# Patient Record
Sex: FEMALE | Race: WHITE | Employment: FULL TIME | URBAN - METROPOLITAN AREA
[De-identification: names, ages, dates, MRNs, and addresses within clinical notes are randomized per-mention and may not be internally consistent; named-entity substitution may affect disease eponyms.]

---

## 2021-03-10 ENCOUNTER — HOSPITAL ENCOUNTER (OUTPATIENT)
Dept: GENERAL RADIOLOGY | Age: 66
Discharge: HOME | End: 2021-03-10

## 2021-03-10 DIAGNOSIS — R06.02 SHORTNESS OF BREATH: ICD-10-CM

## 2021-03-10 DIAGNOSIS — R05.9 COUGH: ICD-10-CM

## 2021-12-21 PROBLEM — N30.01 ACUTE CYSTITIS WITH HEMATURIA: Status: ACTIVE | Noted: 2021-12-21

## 2021-12-21 PROBLEM — F17.200 SMOKING ADDICTION: Status: ACTIVE | Noted: 2021-12-21

## 2021-12-21 PROBLEM — F41.1 GAD (GENERALIZED ANXIETY DISORDER): Status: ACTIVE | Noted: 2021-12-21

## 2021-12-21 PROBLEM — J20.8 ACUTE BRONCHITIS DUE TO OTHER SPECIFIED ORGANISMS: Status: ACTIVE | Noted: 2021-12-21

## 2021-12-21 PROBLEM — R05.9 COUGH: Status: ACTIVE | Noted: 2021-12-21

## 2021-12-21 PROBLEM — F32.A DEPRESSION: Status: ACTIVE | Noted: 2021-12-21

## 2022-03-19 PROBLEM — F32.A DEPRESSION: Status: ACTIVE | Noted: 2021-12-21

## 2022-03-19 PROBLEM — R05.9 COUGH: Status: ACTIVE | Noted: 2021-12-21

## 2022-03-19 PROBLEM — F17.200 SMOKING ADDICTION: Status: ACTIVE | Noted: 2021-12-21

## 2022-03-19 PROBLEM — N30.01 ACUTE CYSTITIS WITH HEMATURIA: Status: ACTIVE | Noted: 2021-12-21

## 2022-03-19 PROBLEM — J20.8 ACUTE BRONCHITIS DUE TO OTHER SPECIFIED ORGANISMS: Status: ACTIVE | Noted: 2021-12-21

## 2022-03-20 PROBLEM — F41.1 GAD (GENERALIZED ANXIETY DISORDER): Status: ACTIVE | Noted: 2021-12-21

## 2022-04-12 ENCOUNTER — NURSE TRIAGE (OUTPATIENT)
Dept: OTHER | Facility: CLINIC | Age: 67
End: 2022-04-12

## 2022-04-12 NOTE — TELEPHONE ENCOUNTER
Received call from Aden Ontiveros at [facility] with [employer] Complaint. Subjective: Caller states \"I have been sick for over a week and it is not  Going a way. I am taking allergy medicine. I have coughing fits that are horrible. I coughed up a yellow colored sputum the other day. I took a covid test 2 days ago and it was negative. \"     Current Symptoms: congestion, cough,  voice hoarseness, sob, fatigue    Onset: 1 week ago; unchanged    Associated Symptoms: reduced activity    Pain Severity: Denies   Temperature: Denies    What has been tried: allegra, inhaler     LMP: NA Pregnant: NA    Recommended disposition: See in Office Today    Care advice provided, patient verbalizes understanding; denies any other questions or concerns; instructed to call back for any new or worsening symptoms. Patient/Caller agrees with recommended disposition; writer provided warm transfer to [place] for appointment scheduling    Attention Provider: Thank you for allowing me to participate in the care of your patient. The patient was connected to triage in response to information provided to the [facility]. Please do not respond through this encounter as the response is not directed to a shared pool.       Reason for Disposition   MILD difficulty breathing (e.g., minimal/no SOB at rest, SOB with walking, pulse <100) and still present when not coughing    Protocols used: COUGH-ADULT-[place]

## 2022-04-14 ENCOUNTER — NURSE TRIAGE (OUTPATIENT)
Dept: OTHER | Facility: CLINIC | Age: 67
End: 2022-04-14

## 2022-04-14 NOTE — TELEPHONE ENCOUNTER
Received call from Teagan Barraza at [facility] with [employer] Complaint. Subjective: Caller states \"Sinus pressure\"     Current Symptoms: Sinus pressure and tooth pain. Taking  hydrocodone and Flonase. Requesting new order of Flonase. Onset: a few days ago; worsening    Associated Symptoms: NA    Pain Severity: 10/10; pain; constant    Temperature: denies fever    What has been tried: Meds    LMP: NA Pregnant: NA    Recommended disposition: Go to Office Now    Care advice provided, patient verbalizes understanding; denies any other questions or concerns; instructed to call back for any new or worsening symptoms. Patient/Caller agrees with recommended disposition; writer provided warm transfer to [place] at [facility] for appointment scheduling    Attention Provider: Thank you for allowing me to participate in the care of your patient. The patient was connected to triage in response to information provided to the [facility]. Please do not respond through this encounter as the response is not directed to a shared pool.       Reason for Disposition   SEVERE sinus pain    Protocols used: SINUS PAIN OR CONGESTION-ADULT-[place]

## 2022-04-19 ENCOUNTER — NURSE TRIAGE (OUTPATIENT)
Dept: OTHER | Facility: CLINIC | Age: 67
End: 2022-04-19

## 2022-04-19 NOTE — TELEPHONE ENCOUNTER
Received call from Elias at [facility] with [employer] Complaint. Subjective: Caller states \"I am worried that I have pneumonia\"     Current Symptoms: chest pain, sore throat are new symptoms. She is also having URI symptoms that she has been evaluated for on 4/12. Onset: 3 days ago; gradual    Associated Symptoms: NA    Pain Severity: 7/10; burning; intermittent worse when coughing    Temperature:  Denies fever    What has been tried: seen by PCP on 4/12. She admits to not following medication regimen as strictly as prescribed. She states that her inhaler is broken, so she has not been using it. LMP: NA Pregnant: No    Recommended disposition: See in Office Today or Tomorrow    Care advice provided, patient verbalizes understanding; denies any other questions or concerns; instructed to call back for any new or worsening symptoms. Patient/Caller agrees with recommended disposition; writer provided warm transfer to [facility] for appointment scheduling    Attention Provider: Thank you for allowing me to participate in the care of your patient. The patient was connected to triage in response to information provided to the [facility]. Please do not respond through this encounter as the response is not directed to a shared pool.     Reason for Disposition   Sinus congestion (pressure, fullness) present > 10 days    Protocols used: COMMON COLD-ADULT-[place]

## 2022-05-24 ENCOUNTER — TELEPHONE (OUTPATIENT)
Dept: FAMILY MEDICINE CLINIC | Facility: CLINIC | Age: 67
End: 2022-05-24

## 2022-05-24 NOTE — TELEPHONE ENCOUNTER
Patient was seen Friday and said that the medication she was given for her Naveed Drum issues has not helped. Her symptoms seems to be getting worse. She asked if you would like her to do a UA or call her in something else?

## 2022-05-25 ENCOUNTER — TELEPHONE (OUTPATIENT)
Dept: FAMILY MEDICINE CLINIC | Facility: CLINIC | Age: 67
End: 2022-05-25

## 2022-05-25 NOTE — TELEPHONE ENCOUNTER
----- Message from Ronak Gonzalez sent at 5/25/2022  9:45 AM EDT -----  Subject: Message to Provider    QUESTIONS  Information for Provider? pt was seen in the ED on 5/24 and received the   meds she was requesting   ---------------------------------------------------------------------------  --------------  CALL BACK INFO  What is the best way for the office to contact you? OK to leave message on   voicemail  Preferred Call Back Phone Number? [phone]  ---------------------------------------------------------------------------  --------------  SCRIPT ANSWERS  Relationship to Patient?  Self

## 2022-07-13 ENCOUNTER — TELEPHONE (OUTPATIENT)
Dept: FAMILY MEDICINE CLINIC | Facility: CLINIC | Age: 67
End: 2022-07-13

## 2022-07-13 NOTE — TELEPHONE ENCOUNTER
Above noted. Patient needs to have a face to face appointment for her symptoms and also for her refills. This was explained to her at her last appointment. She needs further work up for her chronic cough as she is a heavy smoker.   Nataliia Rosales

## 2022-07-13 NOTE — TELEPHONE ENCOUNTER
Patient called and requested an urgent sick appt with the [facility]. Call was transferred to me for appt. Alesia Mendoza had availability for today at 2:00 so I spoke to Alesia Mendoza for approval since patient was presenting covid like symptoms with severe cough and difficulty breathing. Alesia Mendoza requested that patient be scheduled for today but when she arrives at the office to remain in car for the MA to come out and do a covid test prior to visit. Patient declined appt. because she has something to do for work. Advised patient that with the severity of her symptoms that she has reported, it is best she go to [facility] after work. Patient said she may go but was not sure.

## 2022-09-20 ENCOUNTER — TELEPHONE (OUTPATIENT)
Dept: FAMILY MEDICINE CLINIC | Facility: CLINIC | Age: 67
End: 2022-09-20

## 2022-09-20 DIAGNOSIS — F41.1 GAD (GENERALIZED ANXIETY DISORDER): Primary | ICD-10-CM

## 2022-09-20 RX ORDER — ALPRAZOLAM 1 MG/1
1 TABLET ORAL 3 TIMES DAILY
Qty: 21 TABLET | Refills: 0 | Status: SHIPPED | OUTPATIENT
Start: 2022-09-20 | End: 2022-09-27

## 2022-09-20 NOTE — TELEPHONE ENCOUNTER
----- Message from Dolly Ahumada sent at 9/20/2022  8:37 AM EDT -----  Subject: Refill Request    QUESTIONS  Name of Medication? ALPRAZolam (XANAX) 1 MG tablet  Patient-reported dosage and instructions? 1 MG take three times a day   How many days do you have left? 1  Preferred Pharmacy? [email or web address] phone number (if available)? [phone]  Additional Information for Provider? Patient is out of this medication   completely, and she does have an appointment for 09/28, but she is going   to be out of her medication by then. Please call her back as soon as   possible. Thank you.  ---------------------------------------------------------------------------  --------------  CALL BACK INFO  What is the best way for the office to contact you? OK to leave message on   voicemail  Preferred Call Back Phone Number? [phone]  ---------------------------------------------------------------------------  --------------  SCRIPT ANSWERS  Relationship to Patient?  Self

## 2022-09-28 ENCOUNTER — OFFICE VISIT (OUTPATIENT)
Dept: FAMILY MEDICINE CLINIC | Facility: CLINIC | Age: 67
End: 2022-09-28

## 2022-09-28 VITALS
OXYGEN SATURATION: 96 % | RESPIRATION RATE: 16 BRPM | DIASTOLIC BLOOD PRESSURE: 68 MMHG | TEMPERATURE: 97.9 F | BODY MASS INDEX: 24.41 KG/M2 | WEIGHT: 143 LBS | HEIGHT: 64 IN | HEART RATE: 81 BPM | SYSTOLIC BLOOD PRESSURE: 104 MMHG

## 2022-09-28 DIAGNOSIS — J42 CHRONIC BRONCHITIS, UNSPECIFIED CHRONIC BRONCHITIS TYPE (HCC): ICD-10-CM

## 2022-09-28 DIAGNOSIS — F41.0 PANIC DISORDER: Primary | ICD-10-CM

## 2022-09-28 DIAGNOSIS — F51.04 CHRONIC INSOMNIA: ICD-10-CM

## 2022-09-28 LAB
11-NOR-9-THC-9-COOH, POC: NEGATIVE
AMPHETAMINE, URINE, POC: NEGATIVE
BENZODIAZEPINES, URINE, POC: ABNORMAL
BENZOYLECGONINE, URINE, POC: NEGATIVE
METHADONE, URINE, POC: NEGATIVE
METHAMPHETAMINE, URINE, POC: NEGATIVE
MORPHINE, URINE, POC: NEGATIVE
PHENCYCLIDINE, URINE, POC: NEGATIVE
URINALYSIS COLOR, POC: YELLOW

## 2022-09-28 PROCEDURE — 80305 DRUG TEST PRSMV DIR OPT OBS: CPT | Performed by: NURSE PRACTITIONER

## 2022-09-28 PROCEDURE — 99213 OFFICE O/P EST LOW 20 MIN: CPT | Performed by: NURSE PRACTITIONER

## 2022-09-28 PROCEDURE — 1123F ACP DISCUSS/DSCN MKR DOCD: CPT | Performed by: NURSE PRACTITIONER

## 2022-09-28 RX ORDER — ALPRAZOLAM 1 MG/1
1 TABLET ORAL 3 TIMES DAILY
Qty: 90 TABLET | Refills: 2 | Status: SHIPPED | OUTPATIENT
Start: 2022-09-28 | End: 2022-12-27

## 2022-09-28 RX ORDER — ALBUTEROL SULFATE 90 UG/1
2 AEROSOL, METERED RESPIRATORY (INHALATION) EVERY 6 HOURS PRN
Qty: 18 G | Refills: 5 | COMMUNITY
Start: 2022-09-28

## 2022-09-28 RX ORDER — TRAZODONE HYDROCHLORIDE 50 MG/1
TABLET ORAL
Qty: 90 TABLET | Refills: 1 | Status: SHIPPED | OUTPATIENT
Start: 2022-09-28

## 2022-09-28 RX ORDER — ALPRAZOLAM 1 MG/1
1 TABLET ORAL 3 TIMES DAILY
COMMUNITY
Start: 2021-01-20 | End: 2022-09-28 | Stop reason: SDUPTHER

## 2022-09-28 RX ORDER — FLUTICASONE PROPIONATE 50 MCG
2 SPRAY, SUSPENSION (ML) NASAL DAILY
Qty: 16 G | Refills: 5 | COMMUNITY
Start: 2022-09-28

## 2022-09-28 RX ORDER — BUDESONIDE, GLYCOPYRROLATE, AND FORMOTEROL FUMARATE 160; 9; 4.8 UG/1; UG/1; UG/1
AEROSOL, METERED RESPIRATORY (INHALATION)
Qty: 1 EACH | Refills: 5 | COMMUNITY
Start: 2022-09-28

## 2022-09-28 ASSESSMENT — ENCOUNTER SYMPTOMS
DIARRHEA: 0
VOMITING: 0
RHINORRHEA: 0
WHEEZING: 0
BLOOD IN STOOL: 0
CONSTIPATION: 0
CHEST TIGHTNESS: 0
EYE PAIN: 0
SHORTNESS OF BREATH: 0
EYE DISCHARGE: 0
ABDOMINAL PAIN: 0
COUGH: 0

## 2022-09-28 ASSESSMENT — PATIENT HEALTH QUESTIONNAIRE - PHQ9
2. FEELING DOWN, DEPRESSED OR HOPELESS: 0
SUM OF ALL RESPONSES TO PHQ QUESTIONS 1-9: 0
SUM OF ALL RESPONSES TO PHQ9 QUESTIONS 1 & 2: 0
SUM OF ALL RESPONSES TO PHQ QUESTIONS 1-9: 0
1. LITTLE INTEREST OR PLEASURE IN DOING THINGS: 0

## 2022-09-28 NOTE — PATIENT INSTRUCTIONS
Patient Education        A Healthy Lifestyle: Care Instructions  A healthy lifestyle can help you feel good, have more energy, and stay at a weight that's healthy for you. You can share a healthy lifestyle with your friends and family. And you can do it on your own. Eat meals with your friends or family. You could try cooking together. Plan activities with other people. Go for a walk with a friend, try a free online fitness class, or join a sports league. Eat a variety of healthy foods. These include fruits, vegetables, whole grains, low-fat dairy, and lean protein. Choose healthy portions of food. You can use the Nutrition Facts label on food packages as a guide. Eat more fruits and vegetables. You could add vegetables to sandwiches or add fruit to cereal.  Drink water when you are thirsty. Limit soda, juice, and sports drinks. Try to exercise most days. Aim for at least 2½ hours of exercise each week. Keep moving. Work in the garden or take your dog on a walk. Use the stairs instead of the elevator. If you use tobacco or nicotine, try to quit. Ask your doctor about programs and medicines to help you quit. Limit alcohol. Men should have no more than 2 drinks a day. Women should have no more than 1. For some people, no alcohol is the best choice. Follow-up care is a key part of your treatment and safety. Be sure to make and go to all appointments, and call your doctor if you are having problems. It's also a good idea to know your test results and keep a list of the medicines you take. Where can you learn more? Go to [email or web address]://[email or web address].Knip. org and sign in to your [employer] account. Enter E018 in the [facility] box to learn more about \"A Healthy Lifestyle: Care Instructions. \"     If you do not have an account, please click on the \"Sign Up Now\" link. Current as of: March 9, 2022               Content Version: 13.4  © 9653-5865 Healthwise, Incorporated.    Care instructions adapted under license by [facility] ([facility]). If you have questions about a medical condition or this instruction, always ask your healthcare professional. [address] any warranty or liability for your use of this information.

## 2022-09-28 NOTE — PROGRESS NOTES
Destin Stack (:  1955) is a 75 y.o. female,Established patient, here for evaluation of the following chief complaint(s):  Medication Refill and Sleep Problem    UDS:  positive for benzos only. ASSESSMENT/PLAN:  1. Panic disorder  -     ALPRAZolam (XANAX) 1 MG tablet; Take 1 tablet by mouth 3 times daily for 90 days. , Disp-90 tablet, R-2Normal  -     AMB POC DRUG SCREEN, URINE    I have reviewed the patients controlled substance prescription history, as maintained in the [place] prescription monitoring program, so that the prescription(s) for a  controlled substance can be given. No abnormalities were identified. 2. Chronic bronchitis, unspecified chronic bronchitis type (HCC)    Continue inhalers. Smoking cessation. 3. Chronic insomnia  -     traZODone (DESYREL) 50 MG tablet; One or two po at bedtime for sleep., Disp-90 tablet, R-1Normal    Will start Trazodone. Take at bedtime. Good sleep hygiene. Return in about 3 months (around 2022) for medication refills. Subjective   SUBJECTIVE/OBJECTIVE:  Medication refills:  Complains that she is not getting adequate sleep at night. Is unable to fall asleep so a lot of times, she just gets up. Needs refills on her alprazolam today. Has stopped her Zoloft and Wellbutrin. She did not think these were helping. Continues to smoke. Complains of being short of breath at times--uses her inhaler once a day. Continues to work from home. Is trying to qualify for her medicare. Mental Health Problem  Primary symptoms comment: Chronic anxiety, chronic insomnia--worsening. This is a chronic problem. The degree of incapacity that she is experiencing as a consequence of her illness is moderate. Additional symptoms of the illness include insomnia, attention impairment and distractible. Additional symptoms of the illness do not include fatigue, headaches, abdominal pain or seizures.  She does not admit to suicidal ideas. She does not have a plan to attempt suicide. She does not contemplate harming herself. She has not already injured self. She does not contemplate injuring another person. She has not already  injured another person. Allergies   Allergen Reactions    Codeine Nausea And Vomiting     Current Outpatient Medications   Medication Sig    albuterol sulfate HFA (PROVENTIL;VENTOLIN;PROAIR) 108 (90 Base) MCG/ACT inhaler Inhale 2 puffs into the lungs every 6 hours as needed for Wheezing    Budeson-Glycopyrrol-Formoterol (BREZTRI AEROSPHERE) 160-9-4.8 MCG/ACT AERO INHALE TWO PUFFS BY MOUTH TWICE A DAY    fluticasone (FLONASE) 50 MCG/ACT nasal spray 2 sprays by Nasal route daily    ALPRAZolam (XANAX) 1 MG tablet Take 1 tablet by mouth 3 times daily for 90 days. traZODone (DESYREL) 50 MG tablet One or two po at bedtime for sleep. fexofenadine (ALLEGRA) 180 MG tablet Take 180 mg by mouth daily    buPROPion (WELLBUTRIN XL) 150 MG extended release tablet TAKE ONE TABLET BY MOUTH EVERY MORNING (Patient not taking: Reported on 9/28/2022)     No current facility-administered medications for this visit. Review of Systems   Constitutional:  Negative for fatigue and fever. HENT:  Negative for congestion, hearing loss, postnasal drip and rhinorrhea. Eyes:  Negative for pain, discharge and visual disturbance. Respiratory:  Negative for cough, chest tightness, shortness of breath and wheezing. Cardiovascular:  Negative for chest pain, palpitations and leg swelling. Gastrointestinal:  Negative for abdominal pain, blood in stool, constipation, diarrhea and vomiting. Genitourinary:  Negative for difficulty urinating, frequency and urgency. Musculoskeletal:  Negative for arthralgias, gait problem and myalgias. Skin:  Negative for rash. Neurological:  Negative for dizziness, tremors, seizures and headaches. Psychiatric/Behavioral:  Negative for decreased concentration and sleep disturbance.  The patient has insomnia. The patient is not nervous/anxious. Having hard time falling asleep and remaining asleep. Was going to try CBD for it but has not started it yet. Alprazolam continues to help her panic attacks and her anxiety. /68 (Site: Left Upper Arm, Position: Sitting, Cuff Size: Large Adult)   Pulse 81   Temp 97.9 °F (36.6 °C) (Temporal)   Resp 16   Ht 5' 4\" (1.626 m)   Wt 143 lb (64.9 kg)   LMP  (LMP Unknown)   SpO2 96%   BMI 24.55 kg/m²       Objective   Physical Exam  Constitutional:       Appearance: Normal appearance. HENT:      Head: Normocephalic and atraumatic. Right Ear: Tympanic membrane, ear canal and external ear normal.      Left Ear: Tympanic membrane, ear canal and external ear normal.      Nose: Nose normal.      Mouth/Throat:      Mouth: Mucous membranes are moist.   Eyes:      Extraocular Movements: Extraocular movements intact. Conjunctiva/sclera: Conjunctivae normal.      Pupils: Pupils are equal, round, and reactive to light. Cardiovascular:      Rate and Rhythm: Normal rate and regular rhythm. Pulses: Normal pulses. Heart sounds: Normal heart sounds. Pulmonary:      Effort: Pulmonary effort is normal.      Breath sounds: Normal breath sounds. Abdominal:      General: Bowel sounds are normal.   Musculoskeletal:         General: Normal range of motion. Cervical back: Normal range of motion. Skin:     General: Skin is warm and dry. Neurological:      General: No focal deficit present. Mental Status: She is alert and oriented to person, place, and time. Psychiatric:         Mood and Affect: Mood normal.         Behavior: Behavior normal.         Thought Content: Thought content normal.         Judgment: Judgment normal.      PHQ-9 Total Score: 0 (9/28/2022 11:24 AM)  Body mass index is 24.55 kg/m².       On this date 9/28/2022 I have spent 20 minutes reviewing previous notes, test results and face to face with the patient discussing the diagnosis and importance of compliance with the treatment plan as well as documenting on the day of the visit. An electronic signature was used to authenticate this note.     --ASAD Jalloh - CNP

## 2023-01-23 ENCOUNTER — TELEMEDICINE (OUTPATIENT)
Dept: FAMILY MEDICINE CLINIC | Facility: CLINIC | Age: 68
End: 2023-01-23

## 2023-01-23 DIAGNOSIS — F41.1 GAD (GENERALIZED ANXIETY DISORDER): Primary | ICD-10-CM

## 2023-01-23 DIAGNOSIS — F51.04 CHRONIC INSOMNIA: ICD-10-CM

## 2023-01-23 PROCEDURE — 99213 OFFICE O/P EST LOW 20 MIN: CPT | Performed by: NURSE PRACTITIONER

## 2023-01-23 PROCEDURE — 1123F ACP DISCUSS/DSCN MKR DOCD: CPT | Performed by: NURSE PRACTITIONER

## 2023-01-23 RX ORDER — ALPRAZOLAM 1 MG/1
1 TABLET ORAL 3 TIMES DAILY PRN
Qty: 90 TABLET | Refills: 1 | Status: SHIPPED | OUTPATIENT
Start: 2023-01-23 | End: 2023-04-23

## 2023-01-23 RX ORDER — TRAZODONE HYDROCHLORIDE 50 MG/1
TABLET ORAL
Qty: 90 TABLET | Refills: 1 | Status: SHIPPED | OUTPATIENT
Start: 2023-01-23

## 2023-01-23 ASSESSMENT — PATIENT HEALTH QUESTIONNAIRE - PHQ9
1. LITTLE INTEREST OR PLEASURE IN DOING THINGS: 0
SUM OF ALL RESPONSES TO PHQ QUESTIONS 1-9: 0
SUM OF ALL RESPONSES TO PHQ QUESTIONS 1-9: 0
2. FEELING DOWN, DEPRESSED OR HOPELESS: 0
SUM OF ALL RESPONSES TO PHQ QUESTIONS 1-9: 0
SUM OF ALL RESPONSES TO PHQ9 QUESTIONS 1 & 2: 0
SUM OF ALL RESPONSES TO PHQ QUESTIONS 1-9: 0

## 2023-01-23 ASSESSMENT — ENCOUNTER SYMPTOMS
RHINORRHEA: 0
EYE PAIN: 0
SHORTNESS OF BREATH: 0
WHEEZING: 0
CHEST TIGHTNESS: 0
VOMITING: 0
BLOOD IN STOOL: 0
DIARRHEA: 0
CONSTIPATION: 0
COUGH: 0
ABDOMINAL PAIN: 0
EYE DISCHARGE: 0

## 2023-01-23 NOTE — PROGRESS NOTES
Wanda Diehl (:  1955) is a Established patient, here for evaluation of the following:    Assessment & Plan   Below is the assessment and plan developed based on review of pertinent history, physical exam, labs, studies, and medications. 1. EVERT (generalized anxiety disorder)  -     ALPRAZolam (XANAX) 1 MG tablet; Take 1 tablet by mouth 3 times daily as needed for Anxiety for up to 90 days. Max Daily Amount: 3 mg, Disp-90 tablet, R-1Normal    I have reviewed the patients controlled substance prescription history, as maintained in the [place] prescription monitoring program, so that the prescription(s) for a  controlled substance can be given. No abnormalities were identified. Follow up in 3 months. Patient without insurance right now. She cannot afford preventative care but will do her mammogram after she gets onto medicare. 2. Chronic insomnia  -     traZODone (DESYREL) 50 MG tablet; One or two po at bedtime for sleep., Disp-90 tablet, R-1Normal  Continue meds for sleep. Good sleep hygiene. Discussed smoking cessation briefly. Patient interested in quitting, chantix has worked int he past but she cannot afford it now. Return in about 3 months (around 2023) for medication refills. Subjective   Medication refills:   Does not have insurance right now. Cannot seems to get her medicare to work online. Is unsure what steps she needs to do to obtain medicare. Is taking her alprazolam during the day to decrease her anxiety and at bedtime to help her sleep. The trazodone also helps her to sleep but she cannot take it every night as she wakes up groggy. Continues to smoke and is interested in quitting but does not think she can do it without Chantix--which has worked for her in the past.  Cannot afford the Chantix right now. Declines all immunizations (does not do vaccines) and preventative care (no insurance),     Anxiety  Presents for follow-up visit. Symptoms include excessive worry, insomnia, irritability, nervous/anxious behavior and obsessions. Patient reports no chest pain, decreased concentration, dizziness, palpitations or shortness of breath. Symptoms occur constantly. The severity of symptoms is moderate. The quality of sleep is fair. Nighttime awakenings: occasional.     Compliance with medications is %. Insomnia  This is a chronic problem. The problem occurs constantly. The problem has been unchanged. Pertinent negatives include no abdominal pain, arthralgias, chest pain, congestion, coughing, fatigue, fever, headaches, myalgias, rash or vomiting. Associated symptoms comments: Hard to fall asleep and stay asleep. The symptoms are aggravated by stress. Treatments tried: Alprazolam and Trazodone. The treatment provided significant relief. Allergies   Allergen Reactions    Codeine Nausea And Vomiting     Current Outpatient Medications   Medication Sig    traZODone (DESYREL) 50 MG tablet One or two po at bedtime for sleep. ALPRAZolam (XANAX) 1 MG tablet Take 1 tablet by mouth 3 times daily as needed for Anxiety for up to 90 days. Max Daily Amount: 3 mg    albuterol sulfate HFA (PROVENTIL;VENTOLIN;PROAIR) 108 (90 Base) MCG/ACT inhaler Inhale 2 puffs into the lungs every 6 hours as needed for Wheezing    Budeson-Glycopyrrol-Formoterol (BREZTRI AEROSPHERE) 160-9-4.8 MCG/ACT AERO INHALE TWO PUFFS BY MOUTH TWICE A DAY    fluticasone (FLONASE) 50 MCG/ACT nasal spray 2 sprays by Nasal route daily    fexofenadine (ALLEGRA) 180 MG tablet Take 180 mg by mouth daily     No current facility-administered medications for this visit. Review of Systems   Constitutional:  Positive for irritability. Negative for fatigue and fever. HENT:  Negative for congestion, hearing loss, postnasal drip and rhinorrhea. Eyes:  Negative for pain, discharge and visual disturbance.    Respiratory:  Negative for cough, chest tightness, shortness of breath and wheezing. Concerned that she may have COPD. Smokes thru out the day. Is interested in quitting but cannot afford Chantix. Is on an ICS inhaler as well as Albuterol which has helped. Cardiovascular:  Negative for chest pain, palpitations and leg swelling. Gastrointestinal:  Negative for abdominal pain, blood in stool, constipation, diarrhea and vomiting. Genitourinary:  Negative for difficulty urinating, frequency and urgency. Musculoskeletal:  Negative for arthralgias, gait problem and myalgias. Skin:  Negative for rash. Neurological:  Negative for dizziness, tremors, seizures and headaches. Psychiatric/Behavioral:  Positive for sleep disturbance. Negative for decreased concentration. The patient is nervous/anxious and has insomnia. Chronic anxiety and insomnia. Cannot fall asleep and remain asleep. Has been on alprazolam now for many years. Feels the Trazodone helps but causes morning grogginess.               Objective   Patient-Reported Vitals  No data recorded     Physical Exam  [INSTRUCTIONS:  \"[x]\" Indicates a positive item  \"[]\" Indicates a negative item  -- DELETE ALL ITEMS NOT EXAMINED]    Constitutional: [x] Appears well-developed and well-nourished [x] No apparent distress      [] Abnormal -     Mental status: [x] Alert and awake  [x] Oriented to person/place/time [x] Able to follow commands    [] Abnormal -     Eyes:   EOM    [x]  Normal    [] Abnormal -   Sclera  [x]  Normal    [] Abnormal -          Discharge [x]  None visible   [] Abnormal -     HENT: [x] Normocephalic, atraumatic  [] Abnormal -   [x] Mouth/Throat: Mucous membranes are moist    External Ears [x] Normal  [] Abnormal -    Neck: [x] No visualized mass [] Abnormal -     Pulmonary/Chest: [x] Respiratory effort normal   [x] No visualized signs of difficulty breathing or respiratory distress        [] Abnormal -      Musculoskeletal:   [x] Normal gait with no signs of ataxia         [x] Normal range of motion of neck        [] Abnormal -     Neurological:        [x] No Facial Asymmetry (Cranial nerve 7 motor function) (limited exam due to video visit)          [x] No gaze palsy        [] Abnormal -          Skin:        [x] No significant exanthematous lesions or discoloration noted on facial skin         [] Abnormal -            Psychiatric:       [x] Anxious Affect, Maintains eye contact, smiling, answers questions appropriately. [] Abnormal -        [x] No Hallucinations    Other pertinent observable physical exam findings:-     PHQ-9 Total Score: 0 (1/23/2023  5:29 PM)        On this date 1/23/2023 I have spent 25 minutes reviewing previous notes, test results and face to face (virtual) with the patient discussing the diagnosis and importance of compliance with the treatment plan as well as documenting on the day of the visit. Teena Burrell, was evaluated through a synchronous (real-time) audio-video encounter. The patient (or guardian if applicable) is aware that this is a billable service, which includes applicable co-pays. This Virtual Visit was conducted with patient's (and/or legal guardian's) consent. The visit was conducted pursuant to the emergency declaration under the [facility], [address] [place] [address] [facility] authority and the [employer] and [employer] General Act. Patient identification was verified, and a caregiver was present when appropriate. The patient was located at Home: [address] [place] [address] [address] [id number].    Provider was located at [facility] (Appt Dept): [address] [place] [address]  ASAD Juarez - JT

## 2023-01-23 NOTE — PATIENT INSTRUCTIONS
Patient Education        A Healthy Lifestyle: Care Instructions  A healthy lifestyle can help you feel good, have more energy, and stay at a weight that's healthy for you. You can share a healthy lifestyle with your friends and family. And you can do it on your own. Eat meals with your friends or family. You could try cooking together. Plan activities with other people. Go for a walk with a friend, try a free online fitness class, or join a sports league. Eat a variety of healthy foods. These include fruits, vegetables, whole grains, low-fat dairy, and lean protein. Choose healthy portions of food. You can use the Nutrition Facts label on food packages as a guide. Eat more fruits and vegetables. You could add vegetables to sandwiches or add fruit to cereal.   Drink water when you are thirsty. Limit soda, juice, and sports drinks. Try to exercise most days. Aim for at least 2½ hours of exercise each week. Keep moving. Work in the garden or take your dog on a walk. Use the stairs instead of the elevator. If you use tobacco or nicotine, try to quit. Ask your doctor about programs and medicines to help you quit. Limit alcohol. Men should have no more than 2 drinks a day. Women should have no more than 1. For some people, no alcohol is the best choice. Follow-up care is a key part of your treatment and safety. Be sure to make and go to all appointments, and call your doctor if you are having problems. It's also a good idea to know your test results and keep a list of the medicines you take. Where can you learn more? Go to http://www.multani.com/ and enter U807 to learn more about \"A Healthy Lifestyle: Care Instructions. \"  Current as of: March 9, 2022               Content Version: 13.5  © 1054-9402 Healthwise, [employer]. Care instructions adapted under license by [facility] ([facility]).  If you have questions about a medical condition or this instruction, always ask your healthcare professional. [address] any warranty or liability for your use of this information. Patient Education        A Healthy Lifestyle: Care Instructions  A healthy lifestyle can help you feel good, have more energy, and stay at a weight that's healthy for you. You can share a healthy lifestyle with your friends and family. And you can do it on your own. Eat meals with your friends or family. You could try cooking together. Plan activities with other people. Go for a walk with a friend, try a free online fitness class, or join a sports league. Eat a variety of healthy foods. These include fruits, vegetables, whole grains, low-fat dairy, and lean protein. Choose healthy portions of food. You can use the Nutrition Facts label on food packages as a guide. Eat more fruits and vegetables. You could add vegetables to sandwiches or add fruit to cereal.   Drink water when you are thirsty. Limit soda, juice, and sports drinks. Try to exercise most days. Aim for at least 2½ hours of exercise each week. Keep moving. Work in the garden or take your dog on a walk. Use the stairs instead of the elevator. If you use tobacco or nicotine, try to quit. Ask your doctor about programs and medicines to help you quit. Limit alcohol. Men should have no more than 2 drinks a day. Women should have no more than 1. For some people, no alcohol is the best choice. Follow-up care is a key part of your treatment and safety. Be sure to make and go to all appointments, and call your doctor if you are having problems. It's also a good idea to know your test results and keep a list of the medicines you take. Where can you learn more? Go to http://www.multani.com/ and enter U807 to learn more about \"A Healthy Lifestyle: Care Instructions. \"  Current as of: March 9, 2022               Content Version: 13.5  © 9059-6846 Healthwise[employer].    Care instructions adapted under license by [facility] ([facility]). If you have questions about a medical condition or this instruction, always ask your healthcare professional. [place] [address] any warranty or liability for your use of this information.

## 2023-03-07 ENCOUNTER — PATIENT MESSAGE (OUTPATIENT)
Dept: FAMILY MEDICINE CLINIC | Facility: CLINIC | Age: 68
End: 2023-03-07

## 2023-03-07 DIAGNOSIS — F41.1 GAD (GENERALIZED ANXIETY DISORDER): ICD-10-CM

## 2023-03-07 RX ORDER — ALPRAZOLAM 1 MG/1
1 TABLET ORAL 3 TIMES DAILY PRN
Qty: 90 TABLET | Refills: 0 | Status: SHIPPED | OUTPATIENT
Start: 2023-03-07 | End: 2023-06-05

## 2023-03-07 NOTE — TELEPHONE ENCOUNTER
From: Hernando Manriquez  To: Charlene Antonio  Sent: 3/7/2023 8:58 AM EST  Subject: Refill    Please check with Kelsey Dotson concerning my refill. I only got one refill and I usually get two. I am now self pay.  Thank you

## 2023-05-11 ENCOUNTER — TELEPHONE (OUTPATIENT)
Dept: FAMILY MEDICINE CLINIC | Facility: CLINIC | Age: 68
End: 2023-05-11

## 2023-05-11 DIAGNOSIS — F41.1 GAD (GENERALIZED ANXIETY DISORDER): ICD-10-CM

## 2023-05-11 RX ORDER — ALPRAZOLAM 1 MG/1
1 TABLET ORAL 3 TIMES DAILY PRN
Qty: 7 TABLET | Refills: 0 | Status: SHIPPED | OUTPATIENT
Start: 2023-05-11 | End: 2023-05-12 | Stop reason: SDUPTHER

## 2023-05-11 RX ORDER — ALPRAZOLAM 1 MG/1
1 TABLET ORAL 3 TIMES DAILY PRN
Qty: 7 TABLET | Refills: 0 | OUTPATIENT
Start: 2023-05-11 | End: 2023-05-18

## 2023-05-11 NOTE — TELEPHONE ENCOUNTER
Pt. Called in stating she only has 6 days left of her Alprazolam, I informed pt. She is due for an appointment, she is scheduled with Sofia Sotomayor 5/24/2023, wanting to know if partial fill can be sent to [address] [place] [address].  Until her appointment

## 2023-05-11 NOTE — TELEPHONE ENCOUNTER
Patient states that she has severe anxiety and her refills say to take 1 to 3 pills a day and she said 1 pill a day would not be sufficient. To please send in the 14 more pills.     ALPRAZolam (XANAX) 1 MG tablet    Send to [id number]

## 2023-05-12 RX ORDER — ALPRAZOLAM 1 MG/1
1 TABLET ORAL 3 TIMES DAILY PRN
Qty: 21 TABLET | Refills: 0 | Status: SHIPPED | OUTPATIENT
Start: 2023-05-12 | End: 2023-05-19

## 2023-05-12 NOTE — TELEPHONE ENCOUNTER
Spoke to the pharmacist and cancelled the prescription sent in yesterday. Sent in a new prescription for 21 tabs for 7 days.

## 2023-05-24 ENCOUNTER — PATIENT MESSAGE (OUTPATIENT)
Dept: FAMILY MEDICINE CLINIC | Facility: CLINIC | Age: 68
End: 2023-05-24

## 2023-05-24 ENCOUNTER — OFFICE VISIT (OUTPATIENT)
Dept: FAMILY MEDICINE CLINIC | Facility: CLINIC | Age: 68
End: 2023-05-24

## 2023-05-24 VITALS
DIASTOLIC BLOOD PRESSURE: 70 MMHG | SYSTOLIC BLOOD PRESSURE: 104 MMHG | RESPIRATION RATE: 18 BRPM | HEART RATE: 94 BPM | OXYGEN SATURATION: 95 % | HEIGHT: 64 IN | WEIGHT: 147 LBS | BODY MASS INDEX: 25.1 KG/M2 | TEMPERATURE: 97.2 F

## 2023-05-24 DIAGNOSIS — Z79.899 LONG-TERM USE OF HIGH-RISK MEDICATION: ICD-10-CM

## 2023-05-24 DIAGNOSIS — J42 CHRONIC BRONCHITIS, UNSPECIFIED CHRONIC BRONCHITIS TYPE (HCC): ICD-10-CM

## 2023-05-24 DIAGNOSIS — F41.1 GAD (GENERALIZED ANXIETY DISORDER): Primary | ICD-10-CM

## 2023-05-24 DIAGNOSIS — F51.04 CHRONIC INSOMNIA: ICD-10-CM

## 2023-05-24 PROCEDURE — 99213 OFFICE O/P EST LOW 20 MIN: CPT | Performed by: NURSE PRACTITIONER

## 2023-05-24 PROCEDURE — 1123F ACP DISCUSS/DSCN MKR DOCD: CPT | Performed by: NURSE PRACTITIONER

## 2023-05-24 RX ORDER — ALPRAZOLAM 1 MG/1
1 TABLET ORAL 3 TIMES DAILY PRN
Qty: 90 TABLET | Refills: 2 | Status: SHIPPED | OUTPATIENT
Start: 2023-05-24 | End: 2023-08-22

## 2023-05-24 RX ORDER — BUDESONIDE, GLYCOPYRROLATE, AND FORMOTEROL FUMARATE 160; 9; 4.8 UG/1; UG/1; UG/1
AEROSOL, METERED RESPIRATORY (INHALATION)
Qty: 1 EACH | Refills: 5 | Status: CANCELLED | OUTPATIENT
Start: 2023-05-24

## 2023-05-24 RX ORDER — ALBUTEROL SULFATE 90 UG/1
2 AEROSOL, METERED RESPIRATORY (INHALATION) EVERY 6 HOURS PRN
Qty: 18 G | Refills: 5 | Status: SHIPPED | OUTPATIENT
Start: 2023-05-24 | End: 2023-05-25 | Stop reason: SDUPTHER

## 2023-05-24 RX ORDER — TRAZODONE HYDROCHLORIDE 50 MG/1
TABLET ORAL
Qty: 90 TABLET | Refills: 1 | Status: SHIPPED | OUTPATIENT
Start: 2023-05-24

## 2023-05-24 SDOH — ECONOMIC STABILITY: INCOME INSECURITY: HOW HARD IS IT FOR YOU TO PAY FOR THE VERY BASICS LIKE FOOD, HOUSING, MEDICAL CARE, AND HEATING?: SOMEWHAT HARD

## 2023-05-24 SDOH — ECONOMIC STABILITY: FOOD INSECURITY: WITHIN THE PAST 12 MONTHS, THE FOOD YOU BOUGHT JUST DIDN'T LAST AND YOU DIDN'T HAVE MONEY TO GET MORE.: NEVER TRUE

## 2023-05-24 SDOH — ECONOMIC STABILITY: HOUSING INSECURITY
IN THE LAST 12 MONTHS, WAS THERE A TIME WHEN YOU DID NOT HAVE A STEADY PLACE TO SLEEP OR SLEPT IN A SHELTER (INCLUDING NOW)?: NO

## 2023-05-24 SDOH — ECONOMIC STABILITY: FOOD INSECURITY: WITHIN THE PAST 12 MONTHS, YOU WORRIED THAT YOUR FOOD WOULD RUN OUT BEFORE YOU GOT MONEY TO BUY MORE.: NEVER TRUE

## 2023-05-24 ASSESSMENT — PATIENT HEALTH QUESTIONNAIRE - PHQ9
SUM OF ALL RESPONSES TO PHQ QUESTIONS 1-9: 0
1. LITTLE INTEREST OR PLEASURE IN DOING THINGS: 0
SUM OF ALL RESPONSES TO PHQ9 QUESTIONS 1 & 2: 0
SUM OF ALL RESPONSES TO PHQ QUESTIONS 1-9: 0
2. FEELING DOWN, DEPRESSED OR HOPELESS: 0

## 2023-05-24 ASSESSMENT — ENCOUNTER SYMPTOMS
SHORTNESS OF BREATH: 0
COUGH: 0
DIARRHEA: 0
CHEST TIGHTNESS: 0
ABDOMINAL PAIN: 0
EYE PAIN: 0
RHINORRHEA: 0
BLOOD IN STOOL: 0
WHEEZING: 0
CONSTIPATION: 0
EYE DISCHARGE: 0
VOMITING: 0

## 2023-05-24 NOTE — PROGRESS NOTES
Jm Hernandez (:  1955) is a 75 y.o. female,Established patient, here for evaluation of the following chief complaint(s): Anxiety (Follow up ) and Medication Refill    Discussed with patient that we needed her to sign the controlled substance agreement as well as do a drug screen. We cannot tell her ahead of time when the drug screens are due--so she needs to increase her fluids before coming in for an appointment. Even folks without insurance are required to do the drug screen and CSA--this is part of her routine care. Patient unable to void. She tried three times and had three bottles of water. A 30 day script was sent in and patient was instructed to return in a month. Patient was here for three hours. Patient has been on the alprazolam for over 20 years so it was elected not to have her go without meds whlie we wait to do her drug screen. Patient has not had a drug screen prior as since the start of covid, she has only been doing virtual appointments. ASSESSMENT/PLAN:  1. EVERT (generalized anxiety disorder)  -     AMB POC DRUG SCREEN, URINE  -     ALPRAZolam (XANAX) 1 MG tablet; Take 1 tablet by mouth 3 times daily as needed for Anxiety for up to 90 days. Max Daily Amount: 3 mg, Disp-90 tablet, R-2Normal  Patient unable to void. Understands that a drug screen will be done in the future prior to receiving more scripts. Patient made appointment for 30 days. 2. Chronic bronchitis, unspecified chronic bronchitis type (HCC)  -     albuterol sulfate HFA (PROVENTIL;VENTOLIN;PROAIR) 108 (90 Base) MCG/ACT inhaler; Inhale 2 puffs into the lungs every 6 hours as needed for Wheezing, Disp-18 g, R-5Good RX price. [employer] [id number] [facility] [address] Member ID [id number]  Patient complains that her inhaler is expensive thru [facility]--instructed that she can get this script refilled at [facility] for under $17.    Will do patient assistance program for her Breztri.     3. Chronic insomnia  -

## 2023-05-25 RX ORDER — ALBUTEROL SULFATE 90 UG/1
2 AEROSOL, METERED RESPIRATORY (INHALATION) EVERY 6 HOURS PRN
Qty: 18 G | Refills: 5 | Status: SHIPPED | OUTPATIENT
Start: 2023-05-25

## 2023-05-25 NOTE — TELEPHONE ENCOUNTER
From: Dolly Cordova  To: Rusty Lucero  Sent: 5/24/2023 6:10 PM EDT  Subject: Inhaler     I think I was supposed to get a written script to take to [facility]. If this is the case I can pick it up tomorrow.  Thank you,  Kevon Jiménez

## 2023-06-26 ENCOUNTER — TELEMEDICINE (OUTPATIENT)
Dept: FAMILY MEDICINE CLINIC | Facility: CLINIC | Age: 68
End: 2023-06-26

## 2023-06-26 DIAGNOSIS — R06.2 WHEEZING: ICD-10-CM

## 2023-06-26 DIAGNOSIS — J30.1 SEASONAL ALLERGIC RHINITIS DUE TO POLLEN: ICD-10-CM

## 2023-06-26 DIAGNOSIS — H92.02 ACUTE OTALGIA, LEFT: ICD-10-CM

## 2023-06-26 DIAGNOSIS — J01.10 ACUTE NON-RECURRENT FRONTAL SINUSITIS: Primary | ICD-10-CM

## 2023-06-26 PROCEDURE — 1123F ACP DISCUSS/DSCN MKR DOCD: CPT | Performed by: FAMILY MEDICINE

## 2023-06-26 PROCEDURE — 99214 OFFICE O/P EST MOD 30 MIN: CPT | Performed by: FAMILY MEDICINE

## 2023-06-26 RX ORDER — AZITHROMYCIN 250 MG/1
250 TABLET, FILM COATED ORAL SEE ADMIN INSTRUCTIONS
Qty: 6 TABLET | Refills: 0 | Status: SHIPPED | OUTPATIENT
Start: 2023-06-26 | End: 2023-07-01

## 2023-06-26 RX ORDER — FEXOFENADINE HCL 180 MG/1
180 TABLET ORAL DAILY
Qty: 30 TABLET | Refills: 5 | Status: SHIPPED | OUTPATIENT
Start: 2023-06-26

## 2023-06-26 RX ORDER — METHYLPREDNISOLONE 4 MG/1
TABLET ORAL
Qty: 1 KIT | Refills: 0 | Status: SHIPPED | OUTPATIENT
Start: 2023-06-26

## 2023-06-26 ASSESSMENT — PATIENT HEALTH QUESTIONNAIRE - PHQ9
SUM OF ALL RESPONSES TO PHQ QUESTIONS 1-9: 0
2. FEELING DOWN, DEPRESSED OR HOPELESS: 0
1. LITTLE INTEREST OR PLEASURE IN DOING THINGS: 0
SUM OF ALL RESPONSES TO PHQ QUESTIONS 1-9: 0
SUM OF ALL RESPONSES TO PHQ9 QUESTIONS 1 & 2: 0

## 2023-06-26 ASSESSMENT — ENCOUNTER SYMPTOMS
COUGH: 1
SINUS PRESSURE: 0
SHORTNESS OF BREATH: 1
SORE THROAT: 0
SWOLLEN GLANDS: 0
HOARSE VOICE: 0

## 2023-10-11 ENCOUNTER — TELEMEDICINE (OUTPATIENT)
Dept: FAMILY MEDICINE CLINIC | Facility: CLINIC | Age: 68
End: 2023-10-11

## 2023-10-11 DIAGNOSIS — F51.04 CHRONIC INSOMNIA: ICD-10-CM

## 2023-10-11 DIAGNOSIS — J30.1 SEASONAL ALLERGIC RHINITIS DUE TO POLLEN: ICD-10-CM

## 2023-10-11 DIAGNOSIS — F41.9 ANXIETY: Primary | ICD-10-CM

## 2023-10-11 PROCEDURE — 1123F ACP DISCUSS/DSCN MKR DOCD: CPT | Performed by: NURSE PRACTITIONER

## 2023-10-11 PROCEDURE — 99213 OFFICE O/P EST LOW 20 MIN: CPT | Performed by: NURSE PRACTITIONER

## 2023-10-11 RX ORDER — TRAZODONE HYDROCHLORIDE 50 MG/1
TABLET ORAL
Qty: 90 TABLET | Refills: 1 | Status: SHIPPED | OUTPATIENT
Start: 2023-10-11

## 2023-10-11 RX ORDER — FLUTICASONE PROPIONATE 50 MCG
2 SPRAY, SUSPENSION (ML) NASAL DAILY
Qty: 48 G | Refills: 3 | Status: SHIPPED | OUTPATIENT
Start: 2023-10-11

## 2023-10-11 RX ORDER — ALPRAZOLAM 1 MG/1
1 TABLET ORAL 3 TIMES DAILY PRN
Qty: 90 TABLET | Refills: 2 | Status: SHIPPED | OUTPATIENT
Start: 2023-10-11 | End: 2024-01-09

## 2023-10-11 SDOH — ECONOMIC STABILITY: FOOD INSECURITY: WITHIN THE PAST 12 MONTHS, YOU WORRIED THAT YOUR FOOD WOULD RUN OUT BEFORE YOU GOT MONEY TO BUY MORE.: NEVER TRUE

## 2023-10-11 SDOH — ECONOMIC STABILITY: INCOME INSECURITY: HOW HARD IS IT FOR YOU TO PAY FOR THE VERY BASICS LIKE FOOD, HOUSING, MEDICAL CARE, AND HEATING?: NOT HARD AT ALL

## 2023-10-11 SDOH — ECONOMIC STABILITY: FOOD INSECURITY: WITHIN THE PAST 12 MONTHS, THE FOOD YOU BOUGHT JUST DIDN'T LAST AND YOU DIDN'T HAVE MONEY TO GET MORE.: NEVER TRUE

## 2023-10-11 ASSESSMENT — PATIENT HEALTH QUESTIONNAIRE - PHQ9
1. LITTLE INTEREST OR PLEASURE IN DOING THINGS: 0
SUM OF ALL RESPONSES TO PHQ9 QUESTIONS 1 & 2: 0
SUM OF ALL RESPONSES TO PHQ QUESTIONS 1-9: 0
SUM OF ALL RESPONSES TO PHQ QUESTIONS 1-9: 0
2. FEELING DOWN, DEPRESSED OR HOPELESS: 0
SUM OF ALL RESPONSES TO PHQ QUESTIONS 1-9: 0
SUM OF ALL RESPONSES TO PHQ QUESTIONS 1-9: 0

## 2023-10-11 ASSESSMENT — ENCOUNTER SYMPTOMS
RHINORRHEA: 1
SHORTNESS OF BREATH: 0
DIARRHEA: 0
EYE DISCHARGE: 0
CONSTIPATION: 0
WHEEZING: 0
BLOOD IN STOOL: 0
CHEST TIGHTNESS: 0
EYE PAIN: 0

## 2024-01-22 ENCOUNTER — TELEMEDICINE (OUTPATIENT)
Dept: FAMILY MEDICINE CLINIC | Facility: CLINIC | Age: 69
End: 2024-01-22

## 2024-01-22 DIAGNOSIS — F17.200 SMOKING ADDICTION: ICD-10-CM

## 2024-01-22 DIAGNOSIS — J42 CHRONIC BRONCHITIS, UNSPECIFIED CHRONIC BRONCHITIS TYPE (HCC): ICD-10-CM

## 2024-01-22 DIAGNOSIS — J20.9 ACUTE BRONCHITIS, UNSPECIFIED ORGANISM: Primary | ICD-10-CM

## 2024-01-22 PROCEDURE — 99214 OFFICE O/P EST MOD 30 MIN: CPT | Performed by: FAMILY MEDICINE

## 2024-01-22 PROCEDURE — 1123F ACP DISCUSS/DSCN MKR DOCD: CPT | Performed by: FAMILY MEDICINE

## 2024-01-22 RX ORDER — METHYLPREDNISOLONE 4 MG/1
TABLET ORAL
Qty: 1 KIT | Refills: 0 | Status: SHIPPED | OUTPATIENT
Start: 2024-01-22

## 2024-01-22 ASSESSMENT — PATIENT HEALTH QUESTIONNAIRE - PHQ9
2. FEELING DOWN, DEPRESSED OR HOPELESS: 0
SUM OF ALL RESPONSES TO PHQ QUESTIONS 1-9: 0
1. LITTLE INTEREST OR PLEASURE IN DOING THINGS: 0
SUM OF ALL RESPONSES TO PHQ9 QUESTIONS 1 & 2: 0
SUM OF ALL RESPONSES TO PHQ QUESTIONS 1-9: 0

## 2024-01-22 ASSESSMENT — ENCOUNTER SYMPTOMS
SORE THROAT: 0
SINUS PRESSURE: 0
SINUS PAIN: 0
ABDOMINAL DISTENTION: 0
EYE DISCHARGE: 0
VOICE CHANGE: 0
EYE REDNESS: 0
WHEEZING: 1
ABDOMINAL PAIN: 0
VOMITING: 0
EYE PAIN: 0
SHORTNESS OF BREATH: 1
CHOKING: 0
DIARRHEA: 0
COUGH: 1
EYE ITCHING: 0
NAUSEA: 1

## 2024-01-22 NOTE — PROGRESS NOTES
Yolanda Murguia, was evaluated through a synchronous (real-time) audio-video encounter. The patient (or guardian if applicable) is aware that this is a billable service, which includes applicable co-pays. This Virtual Visit was conducted with patient's (and/or legal guardian's) consent. Patient identification was verified, and a caregiver was present when appropriate.   The patient was located at Home: [address] [place] [id number]  Provider was located at Facility (Appt Dept): [address] [place] [phone]      Yolanda Murguia (:  1955) is a Established patient, presenting virtually for evaluation of the following:    Assessment & Plan   Below is the assessment and plan developed based on review of pertinent history, physical exam, labs, studies, and medications.  1. Acute bronchitis, unspecified organism  -     XR CHEST (2 VW); Future  -     methylPREDNISolone (MEDROL DOSEPACK) 4 MG tablet; Take by mouth with meals, Disp-1 kit, R-0Normal  2. Chronic bronchitis, unspecified chronic bronchitis type (HCC)  3. Smoking addiction    Advised patient to drink plenty of fluids, rest, to do steam inhalations 3-4 times a day followed by over-the-counter Saline nasal sprays 3-4 times a day, may use over-the-counter cough and cold medicines.  Advised to check oxygen levels at home- advised to go to the ER if oxygen is less than 90% at rest.  Await chest X-ray.  Advised to finish her course of Cefdinir; start Medrol dose pack.  Advised patient to quit smoking, she wants to but says she is not ready now.    Return if symptoms worsen or fail to improve.       Subjective   HPI    Patient says she has been sick for 1 week now, so went to the [facility] in [place] about 4-5 days ago. She was diagnosed with Pneumonia but did not have a chest X-ray. She was prescribed Prednisone 20 mg 2 tabs daily x 5 days- took the last dose today morning, Cefdinir 300 mg twice daily for

## 2024-01-25 ENCOUNTER — TELEPHONE (OUTPATIENT)
Dept: FAMILY MEDICINE CLINIC | Facility: CLINIC | Age: 69
End: 2024-01-25

## 2024-01-25 NOTE — TELEPHONE ENCOUNTER
Notified patient that xray results have not officially came back but Dr. PITTS did look at them and did not see any pneumonia.

## 2024-01-25 NOTE — TELEPHONE ENCOUNTER
Please let patient know that I do not have an official report from the Radiologist yet. But I did look at the X-ray and did not see any Pneumonia. We will call her back when we get the report back.

## 2024-01-25 NOTE — TELEPHONE ENCOUNTER
Patient stated that the [facility] xr provider said that she would try to expedite her xray to us as soon as possible but she would like to know if you're able to get those results quicker due to her not feeling any better.

## 2024-01-31 ENCOUNTER — OFFICE VISIT (OUTPATIENT)
Dept: FAMILY MEDICINE CLINIC | Facility: CLINIC | Age: 69
End: 2024-01-31

## 2024-01-31 VITALS
SYSTOLIC BLOOD PRESSURE: 108 MMHG | TEMPERATURE: 98.6 F | RESPIRATION RATE: 18 BRPM | OXYGEN SATURATION: 95 % | BODY MASS INDEX: 24.75 KG/M2 | WEIGHT: 145 LBS | DIASTOLIC BLOOD PRESSURE: 74 MMHG | HEART RATE: 87 BPM | HEIGHT: 64 IN

## 2024-01-31 DIAGNOSIS — F41.1 GAD (GENERALIZED ANXIETY DISORDER): Primary | ICD-10-CM

## 2024-01-31 DIAGNOSIS — J42 CHRONIC BRONCHITIS, UNSPECIFIED CHRONIC BRONCHITIS TYPE (HCC): ICD-10-CM

## 2024-01-31 DIAGNOSIS — J30.1 SEASONAL ALLERGIC RHINITIS DUE TO POLLEN: ICD-10-CM

## 2024-01-31 PROCEDURE — 99214 OFFICE O/P EST MOD 30 MIN: CPT | Performed by: NURSE PRACTITIONER

## 2024-01-31 PROCEDURE — 1123F ACP DISCUSS/DSCN MKR DOCD: CPT | Performed by: NURSE PRACTITIONER

## 2024-01-31 RX ORDER — ALPRAZOLAM 1 MG/1
1 TABLET ORAL 3 TIMES DAILY PRN
Qty: 90 TABLET | Refills: 2 | Status: SHIPPED | OUTPATIENT
Start: 2024-01-31 | End: 2024-04-30

## 2024-01-31 RX ORDER — FLUTICASONE PROPIONATE 50 MCG
2 SPRAY, SUSPENSION (ML) NASAL DAILY
Qty: 48 G | Refills: 3 | Status: SHIPPED | OUTPATIENT
Start: 2024-01-31

## 2024-01-31 RX ORDER — ALBUTEROL SULFATE 90 UG/1
2 AEROSOL, METERED RESPIRATORY (INHALATION) EVERY 6 HOURS PRN
Qty: 18 G | Refills: 5 | Status: SHIPPED | OUTPATIENT
Start: 2024-01-31

## 2024-01-31 SDOH — ECONOMIC STABILITY: FOOD INSECURITY: WITHIN THE PAST 12 MONTHS, YOU WORRIED THAT YOUR FOOD WOULD RUN OUT BEFORE YOU GOT MONEY TO BUY MORE.: NEVER TRUE

## 2024-01-31 SDOH — ECONOMIC STABILITY: FOOD INSECURITY: WITHIN THE PAST 12 MONTHS, THE FOOD YOU BOUGHT JUST DIDN'T LAST AND YOU DIDN'T HAVE MONEY TO GET MORE.: NEVER TRUE

## 2024-01-31 ASSESSMENT — PATIENT HEALTH QUESTIONNAIRE - PHQ9
SUM OF ALL RESPONSES TO PHQ QUESTIONS 1-9: 0
1. LITTLE INTEREST OR PLEASURE IN DOING THINGS: 0
SUM OF ALL RESPONSES TO PHQ QUESTIONS 1-9: 0
2. FEELING DOWN, DEPRESSED OR HOPELESS: 0
SUM OF ALL RESPONSES TO PHQ QUESTIONS 1-9: 0
SUM OF ALL RESPONSES TO PHQ QUESTIONS 1-9: 0
SUM OF ALL RESPONSES TO PHQ9 QUESTIONS 1 & 2: 0

## 2024-01-31 ASSESSMENT — ENCOUNTER SYMPTOMS
BLOOD IN STOOL: 0
COUGH: 1
DIARRHEA: 0
SPUTUM PRODUCTION: 1
CHEST TIGHTNESS: 1
EYE DISCHARGE: 0
VOMITING: 0
CONSTIPATION: 0
WHEEZING: 1
ABDOMINAL PAIN: 0
RHINORRHEA: 1
DIFFICULTY BREATHING: 1
SHORTNESS OF BREATH: 1
EYE PAIN: 0
TROUBLE SWALLOWING: 0

## 2024-01-31 NOTE — PATIENT INSTRUCTIONS

## 2024-01-31 NOTE — PROGRESS NOTES
Yolanda Murguia (:  1955) is a 68 y.o. female,Established patient, here for evaluation of the following chief complaint(s):  Pneumonia (Follow up ), Anxiety (Follow up ), and Medication Refill    Smoking Cessation:  discussed with patient.  Will do Chantix 1 mg tabs--1/2 bid for one week then increase to 1 mg po BID.    Will stop smoking on day 7.  Ok to cut back if needed.  Important now to quit as the oxygen levels will only drop further over time.  To Er if O2 levels drop and remain less than 85% for more than 1 hour.      Important for patient to sign up for her medicare.  She really needs to be able to get health care when needed--especially if her oxygen levels drop as they had before.  Patient will go to [facility] office and sign up for her medicare.       ASSESSMENT/PLAN:  1. EVERT (generalized anxiety disorder)  -     ALPRAZolam (XANAX) 1 MG tablet; Take 1 tablet by mouth 3 times daily as needed for Anxiety for up to 90 days. Max Daily Amount: 3 mg, Disp-90 tablet, R-2Normal  I have reviewed the patient’s controlled substance prescription history, as maintained in the [place] prescription monitoring program, so that the prescription(s) for a  controlled substance can be given.  No abnormalities were identified.    Follow up in 3 months.   2. Chronic bronchitis, unspecified chronic bronchitis type (HCC)  -     albuterol sulfate HFA (PROVENTIL;VENTOLIN;PROAIR) 108 (90 Base) MCG/ACT inhaler; Inhale 2 puffs into the lungs every 6 hours as needed for Wheezing, Disp-18 g, R-5Good RX price.  [id number] [facility] [address] Member ID [id number]  Patient most likely has COPD which was complicated when she caught a virus which led to the dropping of her oxygen levels.  Lungs with chronic damage from the smoking.  Patient has refused all vaccines so she has not been vaccinated against RSV, Covid, Flu and pneumonia.    3. Seasonal allergic rhinitis due to pollen  -     fluticasone (FLONASE) 50

## 2024-02-05 ENCOUNTER — TELEMEDICINE (OUTPATIENT)
Dept: FAMILY MEDICINE CLINIC | Facility: CLINIC | Age: 69
End: 2024-02-05

## 2024-02-05 ENCOUNTER — TELEPHONE (OUTPATIENT)
Dept: FAMILY MEDICINE CLINIC | Facility: CLINIC | Age: 69
End: 2024-02-05

## 2024-02-05 DIAGNOSIS — M94.0 COSTOCHONDRITIS: Primary | ICD-10-CM

## 2024-02-05 DIAGNOSIS — J42 CHRONIC BRONCHITIS, UNSPECIFIED CHRONIC BRONCHITIS TYPE (HCC): ICD-10-CM

## 2024-02-05 PROCEDURE — 1123F ACP DISCUSS/DSCN MKR DOCD: CPT | Performed by: NURSE PRACTITIONER

## 2024-02-05 PROCEDURE — 99213 OFFICE O/P EST LOW 20 MIN: CPT | Performed by: NURSE PRACTITIONER

## 2024-02-05 RX ORDER — CYCLOBENZAPRINE HCL 10 MG
10 TABLET ORAL 2 TIMES DAILY PRN
Qty: 20 TABLET | Refills: 0 | Status: SHIPPED | OUTPATIENT
Start: 2024-02-05

## 2024-02-05 RX ORDER — ALBUTEROL SULFATE 90 UG/1
2 AEROSOL, METERED RESPIRATORY (INHALATION) EVERY 6 HOURS PRN
Qty: 18 G | Refills: 5 | Status: SHIPPED | OUTPATIENT
Start: 2024-02-05

## 2024-02-05 RX ORDER — PREDNISONE 10 MG/1
TABLET ORAL
Qty: 48 EACH | Refills: 0 | Status: SHIPPED | OUTPATIENT
Start: 2024-02-05

## 2024-02-05 SDOH — ECONOMIC STABILITY: FOOD INSECURITY: WITHIN THE PAST 12 MONTHS, THE FOOD YOU BOUGHT JUST DIDN'T LAST AND YOU DIDN'T HAVE MONEY TO GET MORE.: NEVER TRUE

## 2024-02-05 SDOH — ECONOMIC STABILITY: FOOD INSECURITY: WITHIN THE PAST 12 MONTHS, YOU WORRIED THAT YOUR FOOD WOULD RUN OUT BEFORE YOU GOT MONEY TO BUY MORE.: NEVER TRUE

## 2024-02-05 SDOH — ECONOMIC STABILITY: INCOME INSECURITY: HOW HARD IS IT FOR YOU TO PAY FOR THE VERY BASICS LIKE FOOD, HOUSING, MEDICAL CARE, AND HEATING?: NOT HARD AT ALL

## 2024-02-05 ASSESSMENT — ENCOUNTER SYMPTOMS
ABDOMINAL PAIN: 0
CONSTIPATION: 0
EYE DISCHARGE: 0
HEMOPTYSIS: 0
BLOOD IN STOOL: 0
VOMITING: 0
COUGH: 1
DIARRHEA: 0
EYE PAIN: 0

## 2024-02-05 ASSESSMENT — COPD QUESTIONNAIRES: COPD: 1

## 2024-02-05 NOTE — TELEPHONE ENCOUNTER
Patient stated she would like for you to send her in some muscle relaxer's, stated that her pain is getting worse.

## 2024-02-05 NOTE — TELEPHONE ENCOUNTER
Flexeril sent to [facility].  If her pain worsens or her pulse ox drops into the 70's again, she really should go to ER.  Juliet Maradiaga

## 2024-02-05 NOTE — TELEPHONE ENCOUNTER
Pt. Advised of Flexeril sent to pharmacy, advised if pulse ox drops into 70s she should go to ER. Pt. Agreed    DISPLAY PLAN FREE TEXT

## 2024-02-05 NOTE — PROGRESS NOTES
Yolanda Murguia, was evaluated through a synchronous (real-time) audio-video encounter. The patient (or guardian if applicable) is aware that this is a billable service, which includes applicable co-pays. This Virtual Visit was conducted with patient's (and/or legal guardian's) consent. Patient identification was verified, and a caregiver was present when appropriate.   The patient was located at Home: [address] [place] [id number]  Provider was located at Facility (Appt Dept): [address] [place] [phone]        Yolanda Murguia (:  1955) is a Established patient, presenting virtually for evaluation of the following:    Assessment & Plan   Below is the assessment and plan developed based on review of pertinent history, physical exam, labs, studies, and medications.  1. Costochondritis  -     predniSONE 10 MG (48) TBPK; 12 day double strength dose pack: take as directed., Disp-48 each, R-0Normal  2. Chronic bronchitis, unspecified chronic bronchitis type (HCC)  -     albuterol sulfate HFA (PROVENTIL;VENTOLIN;PROAIR) 108 (90 Base) MCG/ACT inhaler; Inhale 2 puffs into the lungs every 6 hours as needed for Wheezing, Disp-18 g, R-5Good RX price.  Bin [id number] [facility] [address] Member ID [id number]    Discussed with patient.  Patient refused to go to ER due to costs.  Has not tried her Chantix yet as she is under more stress.  Pulse ox is low and she should be on oxygen--refused.  If with activity, she is dropping into the 70's then her night time ox levels are probably lower.  Explained this to patient.  Patient states she has been honest with her daughter and will go to ER if she feels worse.  Instructed to go by EMS as we cannot predict cause of her symptoms--??? Heart vs. MS.     Return in about 5 days (around 2/10/2024), or if symptoms worsen or fail to improve. To ER via EMS if symptoms worsen.       Subjective   Sharp pain left side--under left arm.  Hurt

## 2024-05-08 ENCOUNTER — TELEMEDICINE (OUTPATIENT)
Dept: FAMILY MEDICINE CLINIC | Facility: CLINIC | Age: 69
End: 2024-05-08

## 2024-05-08 DIAGNOSIS — J30.1 SEASONAL ALLERGIC RHINITIS DUE TO POLLEN: ICD-10-CM

## 2024-05-08 DIAGNOSIS — F41.1 GAD (GENERALIZED ANXIETY DISORDER): Primary | ICD-10-CM

## 2024-05-08 PROCEDURE — 1123F ACP DISCUSS/DSCN MKR DOCD: CPT | Performed by: NURSE PRACTITIONER

## 2024-05-08 PROCEDURE — 99213 OFFICE O/P EST LOW 20 MIN: CPT | Performed by: NURSE PRACTITIONER

## 2024-05-08 RX ORDER — ALPRAZOLAM 1 MG/1
1 TABLET ORAL 3 TIMES DAILY PRN
Qty: 90 TABLET | Refills: 2 | Status: SHIPPED | OUTPATIENT
Start: 2024-05-08 | End: 2024-08-06

## 2024-05-08 RX ORDER — FEXOFENADINE HCL 180 MG/1
180 TABLET ORAL DAILY
Qty: 90 TABLET | Refills: 1 | Status: SHIPPED | OUTPATIENT
Start: 2024-05-08

## 2024-05-08 SDOH — ECONOMIC STABILITY: FOOD INSECURITY: WITHIN THE PAST 12 MONTHS, THE FOOD YOU BOUGHT JUST DIDN'T LAST AND YOU DIDN'T HAVE MONEY TO GET MORE.: NEVER TRUE

## 2024-05-08 SDOH — ECONOMIC STABILITY: FOOD INSECURITY: WITHIN THE PAST 12 MONTHS, YOU WORRIED THAT YOUR FOOD WOULD RUN OUT BEFORE YOU GOT MONEY TO BUY MORE.: NEVER TRUE

## 2024-05-08 SDOH — ECONOMIC STABILITY: INCOME INSECURITY: HOW HARD IS IT FOR YOU TO PAY FOR THE VERY BASICS LIKE FOOD, HOUSING, MEDICAL CARE, AND HEATING?: NOT HARD AT ALL

## 2024-05-08 ASSESSMENT — ENCOUNTER SYMPTOMS
RHINORRHEA: 1
EYE DISCHARGE: 0
VOMITING: 0
EYE PAIN: 0
CHEST TIGHTNESS: 0
CONSTIPATION: 0
WHEEZING: 0
BLOOD IN STOOL: 0
DIARRHEA: 0
COUGH: 0
ABDOMINAL PAIN: 0
SHORTNESS OF BREATH: 1

## 2024-05-08 ASSESSMENT — PATIENT HEALTH QUESTIONNAIRE - PHQ9
SUM OF ALL RESPONSES TO PHQ9 QUESTIONS 1 & 2: 0
SUM OF ALL RESPONSES TO PHQ QUESTIONS 1-9: 0
SUM OF ALL RESPONSES TO PHQ QUESTIONS 1-9: 0
1. LITTLE INTEREST OR PLEASURE IN DOING THINGS: NOT AT ALL
2. FEELING DOWN, DEPRESSED OR HOPELESS: NOT AT ALL
SUM OF ALL RESPONSES TO PHQ QUESTIONS 1-9: 0
SUM OF ALL RESPONSES TO PHQ QUESTIONS 1-9: 0

## 2024-05-08 NOTE — PROGRESS NOTES
Yolanda Murguia, was evaluated through a synchronous (real-time) audio-video encounter. The patient (or guardian if applicable) is aware that this is a billable service, which includes applicable co-pays. This Virtual Visit was conducted with patient's (and/or legal guardian's) consent. Patient identification was verified, and a caregiver was present when appropriate.   The patient was located at Home: [address] [place] [id number]  Provider was located at Facility (Appt Dept): [address] [place] [phone]  Confirm you are appropriately licensed, registered, or certified to deliver care in the state where the patient is located as indicated above. If you are not or unsure, please re-schedule the visit: Yes, I confirm.     Yolanda Murguia (:  1955) is a Established patient, presenting virtually for evaluation of the following:    Assessment & Plan   Below is the assessment and plan developed based on review of pertinent history, physical exam, labs, studies, and medications.  1. EVERT (generalized anxiety disorder)  -     ALPRAZolam (XANAX) 1 MG tablet; Take 1 tablet by mouth 3 times daily as needed for Sleep for up to 90 days. Max Daily Amount: 3 mg, Disp-90 tablet, R-2Normal  I have reviewed the patient’s controlled substance prescription history, as maintained in the [place] prescription monitoring program, so that the prescription(s) for a  controlled substance can be given.  No abnormalities were identified.    Follow up in 3 months.   2. Seasonal allergic rhinitis due to pollen  -     fexofenadine (ALLEGRA) 180 MG tablet; Take 1 tablet by mouth daily, Disp-90 tablet, R-1Normal  Refilled Allegra.  Monitor for signs of infection      Return in about 3 months (around 2024) for medication refills.       Subjective   Continues to work from home.  Has not gone to [facility] to set up medicare yet.  Remains on her alprazolam for her panic and anxiety.  Has

## 2024-08-07 ENCOUNTER — OFFICE VISIT (OUTPATIENT)
Dept: FAMILY MEDICINE CLINIC | Facility: CLINIC | Age: 69
End: 2024-08-07

## 2024-08-07 VITALS
TEMPERATURE: 97.1 F | HEART RATE: 86 BPM | BODY MASS INDEX: 23.56 KG/M2 | DIASTOLIC BLOOD PRESSURE: 66 MMHG | WEIGHT: 138 LBS | SYSTOLIC BLOOD PRESSURE: 110 MMHG | OXYGEN SATURATION: 97 % | HEIGHT: 64 IN | RESPIRATION RATE: 16 BRPM

## 2024-08-07 DIAGNOSIS — J30.1 SEASONAL ALLERGIC RHINITIS DUE TO POLLEN: ICD-10-CM

## 2024-08-07 DIAGNOSIS — Z51.81 THERAPEUTIC DRUG MONITORING: ICD-10-CM

## 2024-08-07 DIAGNOSIS — F51.04 CHRONIC INSOMNIA: ICD-10-CM

## 2024-08-07 DIAGNOSIS — J42 CHRONIC BRONCHITIS, UNSPECIFIED CHRONIC BRONCHITIS TYPE (HCC): ICD-10-CM

## 2024-08-07 DIAGNOSIS — F41.1 GAD (GENERALIZED ANXIETY DISORDER): Primary | ICD-10-CM

## 2024-08-07 LAB
AMPHETAMINE, URINE, POC: NEGATIVE
BARBITURATES, URINE, POC: NEGATIVE
BENZODIAZEPINES, URINE, POC: ABNORMAL
BUPRENORPHINE, URINE, POC: NEGATIVE
COCAINE, URINE, POC: NEGATIVE
CREATININE, URINE, POC: NORMAL MG/DL
LOT EXP DATE, POC: ABNORMAL
Lab: ABNORMAL
MARIJUANA (THC), URINE, POC: NEGATIVE
METHADONE, URINE, POC: NEGATIVE
METHAMPHETAMINE, URINE, POC: NEGATIVE
OPIATES, URINE, POC: NEGATIVE
OXYCODONE, URINE, POC: NEGATIVE
PH, POC: 6
SPECIFIC GRAVITY, URINE, POC: 1.02 (ref 1–1.03)
TRICYCLICS, URINE, POC: NEGATIVE
VALID INTERNAL CONTROL, POC: YES

## 2024-08-07 RX ORDER — FLUTICASONE PROPIONATE 50 MCG
2 SPRAY, SUSPENSION (ML) NASAL DAILY
Qty: 48 G | Refills: 3 | Status: CANCELLED | OUTPATIENT
Start: 2024-08-07

## 2024-08-07 RX ORDER — BUDESONIDE, GLYCOPYRROLATE, AND FORMOTEROL FUMARATE 160; 9; 4.8 UG/1; UG/1; UG/1
AEROSOL, METERED RESPIRATORY (INHALATION)
Qty: 3 EACH | Refills: 3 | Status: SHIPPED | OUTPATIENT
Start: 2024-08-07

## 2024-08-07 RX ORDER — ALBUTEROL SULFATE 90 UG/1
2 AEROSOL, METERED RESPIRATORY (INHALATION) EVERY 6 HOURS PRN
Qty: 18 G | Refills: 5 | Status: SHIPPED | OUTPATIENT
Start: 2024-08-07

## 2024-08-07 RX ORDER — TRAZODONE HYDROCHLORIDE 50 MG/1
TABLET ORAL
Qty: 90 TABLET | Refills: 1 | Status: SHIPPED | OUTPATIENT
Start: 2024-08-07

## 2024-08-07 RX ORDER — ALPRAZOLAM 1 MG/1
1 TABLET ORAL 3 TIMES DAILY PRN
Qty: 90 TABLET | Refills: 2 | Status: SHIPPED | OUTPATIENT
Start: 2024-08-07 | End: 2024-11-05

## 2024-08-07 SDOH — ECONOMIC STABILITY: FOOD INSECURITY: WITHIN THE PAST 12 MONTHS, YOU WORRIED THAT YOUR FOOD WOULD RUN OUT BEFORE YOU GOT MONEY TO BUY MORE.: NEVER TRUE

## 2024-08-07 SDOH — ECONOMIC STABILITY: FOOD INSECURITY: WITHIN THE PAST 12 MONTHS, THE FOOD YOU BOUGHT JUST DIDN'T LAST AND YOU DIDN'T HAVE MONEY TO GET MORE.: NEVER TRUE

## 2024-08-07 SDOH — ECONOMIC STABILITY: INCOME INSECURITY: HOW HARD IS IT FOR YOU TO PAY FOR THE VERY BASICS LIKE FOOD, HOUSING, MEDICAL CARE, AND HEATING?: NOT HARD AT ALL

## 2024-08-07 ASSESSMENT — PATIENT HEALTH QUESTIONNAIRE - PHQ9
SUM OF ALL RESPONSES TO PHQ9 QUESTIONS 1 & 2: 0
SUM OF ALL RESPONSES TO PHQ QUESTIONS 1-9: 0
2. FEELING DOWN, DEPRESSED OR HOPELESS: NOT AT ALL
1. LITTLE INTEREST OR PLEASURE IN DOING THINGS: NOT AT ALL

## 2024-08-07 ASSESSMENT — ENCOUNTER SYMPTOMS
RHINORRHEA: 0
ABDOMINAL PAIN: 0
CONSTIPATION: 0
EYE DISCHARGE: 0
SHORTNESS OF BREATH: 1
CHEST TIGHTNESS: 0
COUGH: 0
DIARRHEA: 0
BLOOD IN STOOL: 0
EYE PAIN: 0
WHEEZING: 0
VOMITING: 0

## 2024-08-07 NOTE — PROGRESS NOTES
suicidal ideas. Symptoms occur most days. The severity of symptoms is moderate. The quality of sleep is fair. Nighttime awakenings: none.     Compliance with medications is %.     Allergies   Allergen Reactions    Codeine Nausea And Vomiting     Current Outpatient Medications   Medication Sig    albuterol sulfate HFA (PROVENTIL;VENTOLIN;PROAIR) 108 (90 Base) MCG/ACT inhaler Inhale 2 puffs into the lungs every 6 hours as needed for Wheezing    Budeson-Glycopyrrol-Formoterol (BREZTRI AEROSPHERE) 160-9-4.8 MCG/ACT AERO INHALE TWO PUFFS BY MOUTH TWICE A DAY    traZODone (DESYREL) 50 MG tablet One or two po at bedtime for sleep.    ALPRAZolam (XANAX) 1 MG tablet Take 1 tablet by mouth 3 times daily as needed for Anxiety for up to 90 days. Max Daily Amount: 3 mg    fexofenadine (ALLEGRA) 180 MG tablet Take 1 tablet by mouth daily    fluticasone (FLONASE) 50 MCG/ACT nasal spray 2 sprays by Each Nostril route daily     No current facility-administered medications for this visit.     Patient Active Problem List   Diagnosis    Cough    Acute bronchitis due to other specified organisms    Depression    Acute cystitis with hematuria    Smoking addiction    EVERT (generalized anxiety disorder)    Seasonal allergic rhinitis due to pollen    Wheezing         Review of Systems   Constitutional:  Positive for irritability. Negative for fatigue and fever.   HENT:  Negative for congestion, hearing loss, postnasal drip and rhinorrhea.    Eyes:  Negative for pain, discharge and visual disturbance.   Respiratory:  Positive for shortness of breath. Negative for cough, chest tightness and wheezing.         Asthma/COPD--cntrolled with inhalers.  Does not use ICS as she should due to costs.     Cardiovascular:  Negative for chest pain, palpitations and leg swelling.   Gastrointestinal:  Negative for abdominal pain, blood in stool, constipation, diarrhea and vomiting.   Genitourinary:  Negative for difficulty urinating, frequency and

## 2024-08-30 ENCOUNTER — OFFICE VISIT (OUTPATIENT)
Dept: FAMILY MEDICINE CLINIC | Facility: CLINIC | Age: 69
End: 2024-08-30

## 2024-08-30 VITALS
BODY MASS INDEX: 23.76 KG/M2 | HEART RATE: 80 BPM | HEIGHT: 64 IN | OXYGEN SATURATION: 97 % | TEMPERATURE: 97.5 F | SYSTOLIC BLOOD PRESSURE: 112 MMHG | DIASTOLIC BLOOD PRESSURE: 72 MMHG | RESPIRATION RATE: 16 BRPM | WEIGHT: 139.2 LBS

## 2024-08-30 DIAGNOSIS — R59.0 LYMPHADENOPATHY, POSTAURICULAR: Primary | ICD-10-CM

## 2024-08-30 DIAGNOSIS — H57.89 EYE SWELLING, RIGHT: ICD-10-CM

## 2024-08-30 PROCEDURE — 99214 OFFICE O/P EST MOD 30 MIN: CPT | Performed by: FAMILY MEDICINE

## 2024-08-30 PROCEDURE — 1123F ACP DISCUSS/DSCN MKR DOCD: CPT | Performed by: FAMILY MEDICINE

## 2024-08-30 RX ORDER — AZITHROMYCIN 250 MG/1
TABLET, FILM COATED ORAL
Qty: 6 TABLET | Refills: 0 | Status: SHIPPED | OUTPATIENT
Start: 2024-08-30 | End: 2024-09-09

## 2024-08-30 ASSESSMENT — PATIENT HEALTH QUESTIONNAIRE - PHQ9
SUM OF ALL RESPONSES TO PHQ QUESTIONS 1-9: 0
1. LITTLE INTEREST OR PLEASURE IN DOING THINGS: NOT AT ALL
2. FEELING DOWN, DEPRESSED OR HOPELESS: NOT AT ALL
SUM OF ALL RESPONSES TO PHQ9 QUESTIONS 1 & 2: 0

## 2024-08-30 ASSESSMENT — ENCOUNTER SYMPTOMS
EYE REDNESS: 0
PHOTOPHOBIA: 0
VOMITING: 0
DIARRHEA: 0
SINUS PAIN: 0
COUGH: 0
ABDOMINAL PAIN: 0
EYE ITCHING: 0
SHORTNESS OF BREATH: 0
COLOR CHANGE: 0
WHEEZING: 0
EYE DISCHARGE: 0
NAUSEA: 0

## 2024-08-30 NOTE — PROGRESS NOTES
Yolanda Murguia (:  1955) is a 69 y.o. female,Established patient, here for evaluation of the following chief complaint(s):  Cyst (Behind right ear, swelling above right eye)         Assessment & Plan  Lymphadenopathy, postauricular    New diagnosis; start Z-delmar- discussed how to take and possible side effects, to also take Ibuprofen 200 mg 3-4 times daily with meals, to use topical ice packs    Orders:    azithromycin (ZITHROMAX) 250 MG tablet; 500mg on day 1 followed by 250mg on days 2 - 5    Eye swelling, right    New diagnosis; as above; advised to use topical ice packs here as well.    Orders:    azithromycin (ZITHROMAX) 250 MG tablet; 500mg on day 1 followed by 250mg on days 2 - 5    Return if symptoms worsen or fail to improve.       Subjective   Cyst  Pertinent negatives include no abdominal pain, chest pain, chills, congestion, coughing, fever, headaches, nausea, rash or vomiting.     Patient of Ms Maradiaga, comes today c/o knot behind the right ear for the last 2 days- sometimes throbs, hurts when pushed and itches. She denies any recent infections in the head and face area in the last 1-2 weeks. Denies any right ear pain or discharge, hearing issues or ringing or balance problems. She also felt something crawling on the side of her right eye yesterday late evening but there was nothing there; that sensation resolved this morning. But she noticed a little puffiness with mild soreness on pushing; does not remember being bitten by anything. She denies any redness or pain or itching or discharge from the right eye; light does not bother her, no vision changes. She also felt a cluster of tiny bumps on the top of her scalp for the last 2 days- has gotten better; minimally sore today, denies any discharge. She has taken Ibuprofen 200 mg 1 tab 2 times yesterday- the pain behind the ear subsides and the swelling goes down too.    Review of Systems   Constitutional:  Negative for chills and fever.

## 2024-09-02 ASSESSMENT — ENCOUNTER SYMPTOMS: EYE PAIN: 1

## 2024-09-04 ENCOUNTER — TELEMEDICINE (OUTPATIENT)
Dept: FAMILY MEDICINE CLINIC | Facility: CLINIC | Age: 69
End: 2024-09-04

## 2024-09-04 DIAGNOSIS — H57.89 EYE SWELLING, RIGHT: ICD-10-CM

## 2024-09-04 DIAGNOSIS — B02.9 HERPES ZOSTER WITHOUT COMPLICATION: Primary | ICD-10-CM

## 2024-09-04 PROCEDURE — 1123F ACP DISCUSS/DSCN MKR DOCD: CPT | Performed by: FAMILY MEDICINE

## 2024-09-04 PROCEDURE — 99214 OFFICE O/P EST MOD 30 MIN: CPT | Performed by: FAMILY MEDICINE

## 2024-09-04 RX ORDER — VALACYCLOVIR HYDROCHLORIDE 500 MG/1
TABLET, FILM COATED ORAL
Qty: 42 TABLET | Refills: 0 | Status: SHIPPED | OUTPATIENT
Start: 2024-09-04

## 2024-09-10 ASSESSMENT — ENCOUNTER SYMPTOMS
NAUSEA: 0
ABDOMINAL PAIN: 0
ABDOMINAL DISTENTION: 0
VOICE CHANGE: 0
COUGH: 0
EYE PAIN: 1
SORE THROAT: 0
EYE REDNESS: 0
EYE ITCHING: 0
WHEEZING: 0
EYE DISCHARGE: 0
CHOKING: 0
COLOR CHANGE: 1
SINUS PRESSURE: 0
DIARRHEA: 0
VOMITING: 0
SHORTNESS OF BREATH: 0
SINUS PAIN: 0
PHOTOPHOBIA: 0

## 2024-11-04 ENCOUNTER — TELEMEDICINE (OUTPATIENT)
Dept: FAMILY MEDICINE CLINIC | Facility: CLINIC | Age: 69
End: 2024-11-04

## 2024-11-04 DIAGNOSIS — F41.1 GAD (GENERALIZED ANXIETY DISORDER): ICD-10-CM

## 2024-11-04 PROCEDURE — 99213 OFFICE O/P EST LOW 20 MIN: CPT | Performed by: NURSE PRACTITIONER

## 2024-11-04 PROCEDURE — 1123F ACP DISCUSS/DSCN MKR DOCD: CPT | Performed by: NURSE PRACTITIONER

## 2024-11-04 RX ORDER — ALPRAZOLAM 1 MG/1
1 TABLET ORAL 3 TIMES DAILY PRN
Qty: 90 TABLET | Refills: 2 | Status: SHIPPED | OUTPATIENT
Start: 2024-11-04 | End: 2025-02-02

## 2024-11-04 SDOH — ECONOMIC STABILITY: FOOD INSECURITY: WITHIN THE PAST 12 MONTHS, YOU WORRIED THAT YOUR FOOD WOULD RUN OUT BEFORE YOU GOT MONEY TO BUY MORE.: NEVER TRUE

## 2024-11-04 SDOH — ECONOMIC STABILITY: FOOD INSECURITY: WITHIN THE PAST 12 MONTHS, THE FOOD YOU BOUGHT JUST DIDN'T LAST AND YOU DIDN'T HAVE MONEY TO GET MORE.: NEVER TRUE

## 2024-11-04 SDOH — ECONOMIC STABILITY: INCOME INSECURITY: HOW HARD IS IT FOR YOU TO PAY FOR THE VERY BASICS LIKE FOOD, HOUSING, MEDICAL CARE, AND HEATING?: NOT HARD AT ALL

## 2024-11-04 ASSESSMENT — ENCOUNTER SYMPTOMS
SHORTNESS OF BREATH: 1
CONSTIPATION: 0
EYE DISCHARGE: 0
WHEEZING: 0
COUGH: 0
RHINORRHEA: 0
ABDOMINAL PAIN: 0
BLOOD IN STOOL: 0
DIARRHEA: 0
CHEST TIGHTNESS: 0
VOMITING: 0
EYE PAIN: 0

## 2024-11-04 ASSESSMENT — PATIENT HEALTH QUESTIONNAIRE - PHQ9
SUM OF ALL RESPONSES TO PHQ QUESTIONS 1-9: 0
1. LITTLE INTEREST OR PLEASURE IN DOING THINGS: NOT AT ALL
SUM OF ALL RESPONSES TO PHQ9 QUESTIONS 1 & 2: 0
SUM OF ALL RESPONSES TO PHQ QUESTIONS 1-9: 0
2. FEELING DOWN, DEPRESSED OR HOPELESS: NOT AT ALL

## 2024-11-04 NOTE — ASSESSMENT & PLAN NOTE
Orders:    ALPRAZolam (XANAX) 1 MG tablet; Take 1 tablet by mouth 3 times daily as needed for Anxiety for up to 90 days. Max Daily Amount: 3 mg    Refilled alprazolam.  I have reviewed the patient’s controlled substance prescription history, as maintained in the [place] prescription monitoring program, so that the prescription(s) for a  controlled substance can be given.  No abnormalities were identified.    Tobacco cessation reminder.   Declined flu shot and preventative care.

## 2024-11-04 NOTE — PROGRESS NOTES
rhinorrhea.    Eyes:  Negative for pain, discharge and visual disturbance.   Respiratory:  Positive for shortness of breath. Negative for cough, chest tightness and wheezing.         Continues to smoke.  Gets short of breath with exertion.  Feels her inhalers have helped.  Has not completed her patient assistance paperwork for her Breztri.     Cardiovascular:  Negative for chest pain, palpitations and leg swelling.        BP has been good when she checks it at home.    Gastrointestinal:  Negative for abdominal pain, blood in stool, constipation, diarrhea and vomiting.   Genitourinary:  Negative for difficulty urinating, frequency and urgency.   Musculoskeletal:  Negative for arthralgias, gait problem and myalgias.   Skin:  Negative for rash.   Neurological:  Negative for dizziness, tremors, seizures and headaches.   Psychiatric/Behavioral:  Negative for decreased concentration and sleep disturbance. The patient is nervous/anxious. The patient does not have insomnia.         Chronic anxiety with panic.  Worse when under stress.  Has had no depression.  Denies SI or HI.  Feels her anxiety is well controlled with her meds.            Objective   Patient-Reported Vitals  No data recorded     Physical Exam  [INSTRUCTIONS:  \"[x]\" Indicates a positive item  \"[]\" Indicates a negative item  -- DELETE ALL ITEMS NOT EXAMINED]    Constitutional: [x] Appears well-developed and well-nourished [x] No apparent distress      [] Abnormal -     Mental status: [x] Alert and awake  [x] Oriented to person/place/time [x] Able to follow commands    [] Abnormal -     Eyes:   EOM    [x]  Normal    [] Abnormal -   Sclera  [x]  Normal    [] Abnormal -          Discharge [x]  None visible   [] Abnormal -     HENT: [x] Normocephalic, atraumatic  [] Abnormal -   [x] Mouth/Throat: Mucous membranes are moist    External Ears [x] Normal  [] Abnormal -    Neck: [x] No visualized mass [] Abnormal -     Pulmonary/Chest: [x] Respiratory effort normal

## 2024-12-18 ENCOUNTER — OFFICE VISIT (OUTPATIENT)
Dept: FAMILY MEDICINE CLINIC | Facility: CLINIC | Age: 69
End: 2024-12-18

## 2024-12-18 VITALS
DIASTOLIC BLOOD PRESSURE: 64 MMHG | BODY MASS INDEX: 23.22 KG/M2 | RESPIRATION RATE: 16 BRPM | SYSTOLIC BLOOD PRESSURE: 102 MMHG | WEIGHT: 136 LBS | TEMPERATURE: 98.1 F | HEIGHT: 64 IN | OXYGEN SATURATION: 93 % | HEART RATE: 99 BPM

## 2024-12-18 DIAGNOSIS — J42 ACUTE EXACERBATION OF CHRONIC BRONCHITIS (HCC): Primary | ICD-10-CM

## 2024-12-18 DIAGNOSIS — J20.9 ACUTE EXACERBATION OF CHRONIC BRONCHITIS (HCC): Primary | ICD-10-CM

## 2024-12-18 PROCEDURE — 1123F ACP DISCUSS/DSCN MKR DOCD: CPT | Performed by: FAMILY MEDICINE

## 2024-12-18 PROCEDURE — 99213 OFFICE O/P EST LOW 20 MIN: CPT | Performed by: FAMILY MEDICINE

## 2024-12-18 RX ORDER — HYDROCODONE BITARTRATE AND ACETAMINOPHEN 7.5; 325 MG/15ML; MG/15ML
5 SOLUTION ORAL 4 TIMES DAILY PRN
Qty: 100 ML | Refills: 0 | Status: SHIPPED | OUTPATIENT
Start: 2024-12-18 | End: 2024-12-21

## 2024-12-18 RX ORDER — AZITHROMYCIN 250 MG/1
TABLET, FILM COATED ORAL
Qty: 6 TABLET | Refills: 0 | Status: SHIPPED | OUTPATIENT
Start: 2024-12-18 | End: 2024-12-28

## 2024-12-18 RX ORDER — PREDNISONE 20 MG/1
20 TABLET ORAL 2 TIMES DAILY
Qty: 10 TABLET | Refills: 0 | Status: SHIPPED | OUTPATIENT
Start: 2024-12-18 | End: 2024-12-23

## 2024-12-18 ASSESSMENT — ENCOUNTER SYMPTOMS: COUGH: 1

## 2024-12-18 NOTE — ASSESSMENT & PLAN NOTE
She probably will need need some antibiotics and prednisone 40 mg daily for the next 5 days.  I have recommended she use her inhaler 4 times a day for the next 1 week to help resolve this.

## 2024-12-18 NOTE — PROGRESS NOTES
Yolanda Murguia (:  1955) is a 69 y.o. female,Established patient, here for evaluation of the following chief complaint(s):  Cough         Assessment & Plan  Acute exacerbation of chronic bronchitis (HCC)  She probably will need need some antibiotics and prednisone 40 mg daily for the next 5 days.  I have recommended she use her inhaler 4 times a day for the next 1 week to help resolve this.           No follow-ups on file.  As needed as needed for symptoms.       Subjective   Bilateral forehead pain about 5 days ago.  Moved into chest with coughing, dry heaves.  Clear phlem.  Gets sob with exertion.  Has a history of exacerbations of her chronic bronchitis requiring prednisone.    Cough        Review of Systems   Respiratory:  Positive for cough.    Mild shortness of breath if she exerts herself.  Patient does not have chronic oxygen at home.  She has had some dry heaves but no jet vomiting and there is been no diarrhea.       Objective   Physical Exam oropharynx was clear.  No cervical adenopathy palpable.  Mild wheezing and minimal rhonchi bilaterally with good air exchange.  Heart tones regular without murmurs             An electronic signature was used to authenticate this note.    --Praful Gay MD

## 2024-12-23 ENCOUNTER — OFFICE VISIT (OUTPATIENT)
Dept: FAMILY MEDICINE CLINIC | Facility: CLINIC | Age: 69
End: 2024-12-23

## 2024-12-23 VITALS
TEMPERATURE: 96.9 F | SYSTOLIC BLOOD PRESSURE: 118 MMHG | BODY MASS INDEX: 23.22 KG/M2 | HEART RATE: 73 BPM | HEIGHT: 64 IN | RESPIRATION RATE: 16 BRPM | OXYGEN SATURATION: 96 % | WEIGHT: 136 LBS | DIASTOLIC BLOOD PRESSURE: 82 MMHG

## 2024-12-23 DIAGNOSIS — J44.1 COPD WITH ACUTE EXACERBATION (HCC): Primary | ICD-10-CM

## 2024-12-23 PROBLEM — F32.A DEPRESSION: Status: RESOLVED | Noted: 2021-12-21 | Resolved: 2024-12-23

## 2024-12-23 PROCEDURE — 99213 OFFICE O/P EST LOW 20 MIN: CPT | Performed by: FAMILY MEDICINE

## 2024-12-23 PROCEDURE — 1123F ACP DISCUSS/DSCN MKR DOCD: CPT | Performed by: FAMILY MEDICINE

## 2024-12-23 RX ORDER — METHYLPREDNISOLONE 4 MG/1
TABLET ORAL
Qty: 1 KIT | Refills: 0 | Status: SHIPPED | OUTPATIENT
Start: 2024-12-23 | End: 2024-12-29

## 2024-12-23 NOTE — PROGRESS NOTES
Yolanda Murguia (:  1955) is a 69 y.o. female,Established patient, here for evaluation of the following chief complaint(s):  Follow-up (Follow up on bronchitis, breathing issues and med refill on inhaler)         Assessment & Plan  COPD with acute exacerbation (HCC)    Will place on Augmentin since he has finished the Zithromax.  Will do Medrol pack x 1.  Patient given samples of Breztri.  Continue albuterol.    Orders:    methylPREDNISolone (MEDROL DOSEPACK) 4 MG tablet; Take by mouth.    amoxicillin-clavulanate (AUGMENTIN) 875-125 MG per tablet; Take 1 tablet by mouth 2 times daily for 10 days      Return if symptoms worsen or fail to improve.       Subjective   69-year-old female was diagnosed with COPD exacerbation on 2024.  Prescribed Zithromax, prednisone, albuterol inhaler, hydrocodone/Tylenol. Cough and weakness. Coughing at night. + post nasal drip. Using flonase. Productive cough with clear phlegm. Wheezing and sob but not as bad. No fever or chills.  Better but not completely resolved.  Continues to smoke 1 pack/day.  Patient says that when she gets bronchitis she usually needs 2 rounds of antibiotics to resolve her symptoms.          Review of Systems       Objective   Physical Exam  Constitutional:       Appearance: Normal appearance.   HENT:      Head: Normocephalic.   Cardiovascular:      Rate and Rhythm: Regular rhythm.   Pulmonary:      Breath sounds: Wheezing present. No rhonchi.      Comments: + expiratory wheezing both lung fields  Neurological:      Mental Status: She is alert.                  An electronic signature was used to authenticate this note.    --Prashant Rsuh MD

## 2025-02-25 ENCOUNTER — OFFICE VISIT (OUTPATIENT)
Dept: FAMILY MEDICINE CLINIC | Facility: CLINIC | Age: 70
End: 2025-02-25

## 2025-02-25 VITALS
BODY MASS INDEX: 23.31 KG/M2 | HEART RATE: 88 BPM | DIASTOLIC BLOOD PRESSURE: 70 MMHG | TEMPERATURE: 97 F | WEIGHT: 136.5 LBS | RESPIRATION RATE: 16 BRPM | OXYGEN SATURATION: 94 % | SYSTOLIC BLOOD PRESSURE: 120 MMHG | HEIGHT: 64 IN

## 2025-02-25 DIAGNOSIS — J44.1 COPD WITH ACUTE EXACERBATION (HCC): ICD-10-CM

## 2025-02-25 DIAGNOSIS — R05.1 ACUTE COUGH: Primary | ICD-10-CM

## 2025-02-25 DIAGNOSIS — F17.210 CIGARETTE SMOKER MOTIVATED TO QUIT: ICD-10-CM

## 2025-02-25 PROCEDURE — 99213 OFFICE O/P EST LOW 20 MIN: CPT | Performed by: FAMILY MEDICINE

## 2025-02-25 PROCEDURE — 1123F ACP DISCUSS/DSCN MKR DOCD: CPT | Performed by: FAMILY MEDICINE

## 2025-02-25 RX ORDER — PREDNISONE 20 MG/1
40 TABLET ORAL DAILY
Qty: 10 TABLET | Refills: 0 | Status: SHIPPED | OUTPATIENT
Start: 2025-02-25 | End: 2025-03-02

## 2025-02-25 RX ORDER — BUPROPION HYDROCHLORIDE 150 MG/1
TABLET ORAL
Qty: 57 TABLET | Refills: 0 | Status: SHIPPED | OUTPATIENT
Start: 2025-02-25 | End: 2025-03-27

## 2025-02-25 ASSESSMENT — PATIENT HEALTH QUESTIONNAIRE - PHQ9
SUM OF ALL RESPONSES TO PHQ QUESTIONS 1-9: 0
SUM OF ALL RESPONSES TO PHQ QUESTIONS 1-9: 0
SUM OF ALL RESPONSES TO PHQ9 QUESTIONS 1 & 2: 0
SUM OF ALL RESPONSES TO PHQ QUESTIONS 1-9: 0
2. FEELING DOWN, DEPRESSED OR HOPELESS: NOT AT ALL
SUM OF ALL RESPONSES TO PHQ QUESTIONS 1-9: 0
1. LITTLE INTEREST OR PLEASURE IN DOING THINGS: NOT AT ALL

## 2025-02-25 NOTE — ASSESSMENT & PLAN NOTE
New, not at goal (unstable), Short course of steroids given COPD Hx    Orders:    predniSONE (DELTASONE) 20 MG tablet; Take 2 tablets by mouth daily for 5 days

## 2025-02-25 NOTE — PROGRESS NOTES
Yolanda Murguia (:  1955) is a 69 y.o. female,Established patient, here for evaluation of the following chief complaint(s):  Cold Symptoms (Cough and chest congestion for 5 day. )    Assessment & Plan  Acute cough   New, not at goal (unstable), Short course of steroids given COPD Hx    Orders:    predniSONE (DELTASONE) 20 MG tablet; Take 2 tablets by mouth daily for 5 days    COPD with acute exacerbation (HCC)   Chronic, not at goal (unstable),      Orders:    predniSONE (DELTASONE) 20 MG tablet; Take 2 tablets by mouth daily for 5 days    Cigarette smoker motivated to quit   Chronic, not at goal (unstable), pre-contemplative phase      Subjective   HPI  Yolanda Murguia (:  1955) is a 69 y.o. female,Established patient, here for evaluation of the following chief complaint(s):Cold Symptoms (Cough and chest congestion for 5 day. )    Started 4 days  ago as a back pain issue, then had cough, aches and breathing issues. Does report a sick contact in her grandson in the last week. He has fevers and has recovered. She works as  and wants to get back to work. She uses daily Breztri and albuterol inhaler as needed for rescue.   Is a smoker, 1/2 pack a day.     Review of Systems   All other pertinent systems reviewed and negative except as noted in the HPI.    Objective   Physical Exam   General: Alert; No acute distress   Eyes: PERRL, Scleral icterus absent   Head: Normocephalic and atraumatic;   Oropharynx moist and clear ; Neck: JVP absent   Chest: Diffuse expiratory wheezes bilaterally; Normal respiratory effort   Cardiovascular: RRR, No m/r/g   Abdomen: Soft, non-tender, non-distended; Normal bowel sounds  Extremities: Warm and well perfused; no cyanosis; Edema absent   Neuro: Oriented x 3; able to move all extremities.   MSK: Normal muscle mass, normal bulk and tone.   Skin: Clean, dry, intact. No visible rashes or lesions.     An electronic signature was used to

## 2025-03-03 ENCOUNTER — OFFICE VISIT (OUTPATIENT)
Dept: FAMILY MEDICINE CLINIC | Facility: CLINIC | Age: 70
End: 2025-03-03

## 2025-03-03 VITALS
SYSTOLIC BLOOD PRESSURE: 106 MMHG | OXYGEN SATURATION: 96 % | HEIGHT: 64 IN | WEIGHT: 135 LBS | TEMPERATURE: 97.2 F | RESPIRATION RATE: 18 BRPM | HEART RATE: 84 BPM | DIASTOLIC BLOOD PRESSURE: 70 MMHG | BODY MASS INDEX: 23.05 KG/M2

## 2025-03-03 DIAGNOSIS — J20.8 ACUTE BRONCHITIS DUE TO OTHER SPECIFIED ORGANISMS: ICD-10-CM

## 2025-03-03 DIAGNOSIS — F41.1 GAD (GENERALIZED ANXIETY DISORDER): Primary | ICD-10-CM

## 2025-03-03 DIAGNOSIS — F51.04 CHRONIC INSOMNIA: ICD-10-CM

## 2025-03-03 DIAGNOSIS — J30.1 SEASONAL ALLERGIC RHINITIS DUE TO POLLEN: ICD-10-CM

## 2025-03-03 DIAGNOSIS — J42 CHRONIC BRONCHITIS, UNSPECIFIED CHRONIC BRONCHITIS TYPE (HCC): ICD-10-CM

## 2025-03-03 DIAGNOSIS — Z72.0 CONTINUOUS TOBACCO ABUSE: ICD-10-CM

## 2025-03-03 PROCEDURE — 1123F ACP DISCUSS/DSCN MKR DOCD: CPT | Performed by: NURSE PRACTITIONER

## 2025-03-03 PROCEDURE — 99214 OFFICE O/P EST MOD 30 MIN: CPT | Performed by: NURSE PRACTITIONER

## 2025-03-03 RX ORDER — BUDESONIDE, GLYCOPYRROLATE, AND FORMOTEROL FUMARATE 160; 9; 4.8 UG/1; UG/1; UG/1
AEROSOL, METERED RESPIRATORY (INHALATION)
Qty: 3 EACH | Refills: 3 | Status: CANCELLED | OUTPATIENT
Start: 2025-03-03

## 2025-03-03 RX ORDER — PREDNISONE 10 MG/1
TABLET ORAL
Qty: 48 EACH | Refills: 0 | Status: SHIPPED | OUTPATIENT
Start: 2025-03-03

## 2025-03-03 RX ORDER — BUPROPION HYDROCHLORIDE 300 MG/1
300 TABLET ORAL EVERY MORNING
Qty: 90 TABLET | Refills: 0 | Status: SHIPPED | OUTPATIENT
Start: 2025-03-03

## 2025-03-03 RX ORDER — ALPRAZOLAM 1 MG/1
TABLET ORAL
COMMUNITY
Start: 2025-02-23

## 2025-03-03 RX ORDER — BUPROPION HYDROCHLORIDE 150 MG/1
TABLET ORAL
Qty: 57 TABLET | Refills: 0 | Status: CANCELLED | OUTPATIENT
Start: 2025-03-03 | End: 2025-04-02

## 2025-03-03 RX ORDER — BUDESONIDE, GLYCOPYRROLATE, AND FORMOTEROL FUMARATE 160; 9; 4.8 UG/1; UG/1; UG/1
AEROSOL, METERED RESPIRATORY (INHALATION)
Qty: 3 EACH | Refills: 3 | COMMUNITY
Start: 2025-03-03

## 2025-03-03 RX ORDER — TRAZODONE HYDROCHLORIDE 50 MG/1
TABLET ORAL
Qty: 90 TABLET | Refills: 1 | Status: SHIPPED | OUTPATIENT
Start: 2025-03-03

## 2025-03-03 RX ORDER — FEXOFENADINE HCL 180 MG/1
180 TABLET ORAL DAILY
Qty: 90 TABLET | Refills: 1 | Status: SHIPPED | OUTPATIENT
Start: 2025-03-03

## 2025-03-03 RX ORDER — ALPRAZOLAM 1 MG/1
1 TABLET ORAL 3 TIMES DAILY PRN
Qty: 90 TABLET | Refills: 2 | Status: SHIPPED | OUTPATIENT
Start: 2025-03-03 | End: 2025-06-01

## 2025-03-03 RX ORDER — DOXYCYCLINE HYCLATE 100 MG
100 TABLET ORAL 2 TIMES DAILY
Qty: 20 TABLET | Refills: 0 | Status: SHIPPED | OUTPATIENT
Start: 2025-03-03 | End: 2025-03-13

## 2025-03-03 RX ORDER — ALBUTEROL SULFATE 90 UG/1
2 INHALANT RESPIRATORY (INHALATION) EVERY 6 HOURS PRN
Qty: 18 G | Refills: 5 | Status: SHIPPED | OUTPATIENT
Start: 2025-03-03

## 2025-03-03 SDOH — ECONOMIC STABILITY: FOOD INSECURITY: WITHIN THE PAST 12 MONTHS, THE FOOD YOU BOUGHT JUST DIDN'T LAST AND YOU DIDN'T HAVE MONEY TO GET MORE.: NEVER TRUE

## 2025-03-03 SDOH — ECONOMIC STABILITY: FOOD INSECURITY: WITHIN THE PAST 12 MONTHS, YOU WORRIED THAT YOUR FOOD WOULD RUN OUT BEFORE YOU GOT MONEY TO BUY MORE.: NEVER TRUE

## 2025-03-03 ASSESSMENT — PATIENT HEALTH QUESTIONNAIRE - PHQ9
SUM OF ALL RESPONSES TO PHQ QUESTIONS 1-9: 0
1. LITTLE INTEREST OR PLEASURE IN DOING THINGS: NOT AT ALL
SUM OF ALL RESPONSES TO PHQ QUESTIONS 1-9: 0
2. FEELING DOWN, DEPRESSED OR HOPELESS: NOT AT ALL

## 2025-03-03 NOTE — ASSESSMENT & PLAN NOTE
Orders:    ALPRAZolam (XANAX) 1 MG tablet; Take 1 tablet by mouth 3 times daily as needed for Anxiety for up to 90 days. Max Daily Amount: 3 mg    I have reviewed the patient’s controlled substance prescription history, as maintained in the [place] prescription monitoring program, so that the prescription(s) for a  controlled substance can be given.  No abnormalities were identified.    Have encouraged patient to decrease her dosage but now she is trying to quit smoking which has increased her anxiety.

## 2025-03-03 NOTE — PROGRESS NOTES
Seasonal allergic rhinitis due to pollen       Orders:  •  fexofenadine (ALLEGRA) 180 MG tablet; Take 1 tablet by mouth daily    Continue antihistamine.    EVERT (generalized anxiety disorder)       Orders:  •  ALPRAZolam (XANAX) 1 MG tablet; Take 1 tablet by mouth 3 times daily as needed for Anxiety for up to 90 days. Max Daily Amount: 3 mg    I have reviewed the patient’s controlled substance prescription history, as maintained in the [place] prescription monitoring program, so that the prescription(s) for a  controlled substance can be given.  No abnormalities were identified.    Have encouraged patient to decrease her dosage but now she is trying to quit smoking which has increased her anxiety.   Continuous tobacco abuse   Chronic, worsening (exacerbation), continue current treatment plan, medication adherence emphasized, and lifestyle modifications recommended    Orders:  •  buPROPion (WELLBUTRIN XL) 300 MG extended release tablet; Take 1 tablet by mouth every morning    See above    Return in about 1 week (around 3/10/2025), or if symptoms worsen or fail to improve.       Subjective   Was in last Wednesday for her congestion and cough.  Only got steroids for her symptoms.  Symptoms have not improved.  She is still coughing.  Has shortness of breath.  Feels like she cannot take a deep breath.  Was started on Wellbutrin for smoking cessation.  Previously she was on Chantix which worked well but is too expensive.  Has tried to cut back but is under a lot of stress.  Has moved her home office down stairs as she was having trouble going up and down stairs with her shortness of breath.  Feels her self wheezing at night.  Was on Bretzi--which she got thru the patient assistance program but has been nursong a sample of it for the past few weeks as she did not complete the patient work to continue in the program.  Needs refills on her alprazolam.  Does not want to decrease her dosage as she is trying to stop

## 2025-03-03 NOTE — ASSESSMENT & PLAN NOTE
Orders:    fexofenadine (ALLEGRA) 180 MG tablet; Take 1 tablet by mouth daily    Continue antihistamine.

## 2025-03-04 ASSESSMENT — ENCOUNTER SYMPTOMS
CHEST TIGHTNESS: 0
BLOOD IN STOOL: 0
COUGH: 1
SHORTNESS OF BREATH: 0
EYE DISCHARGE: 0
VOMITING: 0
ABDOMINAL PAIN: 0
SORE THROAT: 0
CONSTIPATION: 0
RHINORRHEA: 1
WHEEZING: 1
DIARRHEA: 0
EYE PAIN: 0
SINUS PRESSURE: 1

## 2025-03-04 NOTE — ASSESSMENT & PLAN NOTE
New, uncertain prognosis, changes made today: begin doxycycline, medication adherence emphasized, and lifestyle modifications recommended    Orders:    doxycycline hyclate (VIBRA-TABS) 100 MG tablet; Take 1 tablet by mouth 2 times daily for 10 days    Follow up 3-4 days if no better  Monitor temperature.  Increase fluids  OTC Coricidin for symptom control.  To ER with worsening symptoms, high fever, severe neck pain, severe headache, sore throat, difficulty breathing.  Good hand hygiene.

## 2025-05-29 ENCOUNTER — OFFICE VISIT (OUTPATIENT)
Dept: FAMILY MEDICINE CLINIC | Facility: CLINIC | Age: 70
End: 2025-05-29

## 2025-05-29 VITALS
RESPIRATION RATE: 18 BRPM | SYSTOLIC BLOOD PRESSURE: 110 MMHG | TEMPERATURE: 97.1 F | HEART RATE: 84 BPM | BODY MASS INDEX: 23.39 KG/M2 | OXYGEN SATURATION: 95 % | WEIGHT: 137 LBS | DIASTOLIC BLOOD PRESSURE: 70 MMHG | HEIGHT: 64 IN

## 2025-05-29 DIAGNOSIS — F51.04 CHRONIC INSOMNIA: ICD-10-CM

## 2025-05-29 DIAGNOSIS — J42 CHRONIC BRONCHITIS, UNSPECIFIED CHRONIC BRONCHITIS TYPE (HCC): ICD-10-CM

## 2025-05-29 DIAGNOSIS — J30.1 SEASONAL ALLERGIC RHINITIS DUE TO POLLEN: ICD-10-CM

## 2025-05-29 DIAGNOSIS — M54.41 ACUTE RIGHT-SIDED LOW BACK PAIN WITH RIGHT-SIDED SCIATICA: Primary | ICD-10-CM

## 2025-05-29 DIAGNOSIS — Z72.0 CONTINUOUS TOBACCO ABUSE: ICD-10-CM

## 2025-05-29 DIAGNOSIS — F41.1 GAD (GENERALIZED ANXIETY DISORDER): ICD-10-CM

## 2025-05-29 PROCEDURE — 96372 THER/PROPH/DIAG INJ SC/IM: CPT | Performed by: NURSE PRACTITIONER

## 2025-05-29 PROCEDURE — 99214 OFFICE O/P EST MOD 30 MIN: CPT | Performed by: NURSE PRACTITIONER

## 2025-05-29 PROCEDURE — 1123F ACP DISCUSS/DSCN MKR DOCD: CPT | Performed by: NURSE PRACTITIONER

## 2025-05-29 RX ORDER — FEXOFENADINE HCL 180 MG/1
180 TABLET ORAL DAILY
Qty: 90 TABLET | Refills: 1 | Status: CANCELLED | OUTPATIENT
Start: 2025-05-29

## 2025-05-29 RX ORDER — CYCLOBENZAPRINE HCL 10 MG
10 TABLET ORAL 3 TIMES DAILY PRN
Qty: 21 TABLET | Refills: 0 | Status: SHIPPED | OUTPATIENT
Start: 2025-05-29 | End: 2025-06-08

## 2025-05-29 RX ORDER — DEXAMETHASONE SODIUM PHOSPHATE 10 MG/ML
15 INJECTION, SOLUTION INTRA-ARTICULAR; INTRALESIONAL; INTRAMUSCULAR; INTRAVENOUS; SOFT TISSUE ONCE
Qty: 1.5 ML | Refills: 0
Start: 2025-05-29 | End: 2025-05-29 | Stop reason: SDUPTHER

## 2025-05-29 RX ORDER — BUPROPION HYDROCHLORIDE 300 MG/1
300 TABLET ORAL EVERY MORNING
Qty: 90 TABLET | Refills: 0 | Status: SHIPPED | OUTPATIENT
Start: 2025-05-29

## 2025-05-29 RX ORDER — TRAZODONE HYDROCHLORIDE 50 MG/1
TABLET ORAL
Qty: 90 TABLET | Refills: 1 | Status: CANCELLED | OUTPATIENT
Start: 2025-05-29

## 2025-05-29 RX ORDER — DEXAMETHASONE SODIUM PHOSPHATE 10 MG/ML
15 INJECTION, SOLUTION INTRA-ARTICULAR; INTRALESIONAL; INTRAMUSCULAR; INTRAVENOUS; SOFT TISSUE ONCE
Status: DISCONTINUED | OUTPATIENT
Start: 2025-05-29 | End: 2025-05-29

## 2025-05-29 RX ORDER — ALBUTEROL SULFATE 90 UG/1
2 INHALANT RESPIRATORY (INHALATION) EVERY 6 HOURS PRN
Qty: 18 G | Refills: 5 | Status: SHIPPED | OUTPATIENT
Start: 2025-05-29

## 2025-05-29 RX ORDER — ALPRAZOLAM 1 MG/1
1 TABLET ORAL 3 TIMES DAILY PRN
Qty: 90 TABLET | Refills: 2 | Status: SHIPPED | OUTPATIENT
Start: 2025-05-29 | End: 2025-08-27

## 2025-05-29 RX ORDER — BUDESONIDE, GLYCOPYRROLATE, AND FORMOTEROL FUMARATE 160; 9; 4.8 UG/1; UG/1; UG/1
AEROSOL, METERED RESPIRATORY (INHALATION)
Qty: 3 EACH | Refills: 3 | Status: CANCELLED | OUTPATIENT
Start: 2025-05-29

## 2025-05-29 RX ORDER — DEXAMETHASONE SODIUM PHOSPHATE 10 MG/ML
15 INJECTION, SOLUTION INTRAMUSCULAR; INTRAVENOUS ONCE
Status: COMPLETED | OUTPATIENT
Start: 2025-05-29 | End: 2025-05-29

## 2025-05-29 RX ADMIN — DEXAMETHASONE SODIUM PHOSPHATE 15 MG: 10 INJECTION, SOLUTION INTRAMUSCULAR; INTRAVENOUS at 16:06

## 2025-05-29 SDOH — ECONOMIC STABILITY: FOOD INSECURITY: WITHIN THE PAST 12 MONTHS, THE FOOD YOU BOUGHT JUST DIDN'T LAST AND YOU DIDN'T HAVE MONEY TO GET MORE.: NEVER TRUE

## 2025-05-29 SDOH — ECONOMIC STABILITY: FOOD INSECURITY: WITHIN THE PAST 12 MONTHS, YOU WORRIED THAT YOUR FOOD WOULD RUN OUT BEFORE YOU GOT MONEY TO BUY MORE.: NEVER TRUE

## 2025-05-29 ASSESSMENT — ENCOUNTER SYMPTOMS
BLOOD IN STOOL: 0
CHEST TIGHTNESS: 0
RHINORRHEA: 0
VOMITING: 0
SHORTNESS OF BREATH: 0
ABDOMINAL PAIN: 0
COUGH: 0
DIARRHEA: 0
EYE PAIN: 0
WHEEZING: 0
EYE DISCHARGE: 0
CONSTIPATION: 0

## 2025-05-29 ASSESSMENT — PATIENT HEALTH QUESTIONNAIRE - PHQ9
SUM OF ALL RESPONSES TO PHQ QUESTIONS 1-9: 0
2. FEELING DOWN, DEPRESSED OR HOPELESS: NOT AT ALL
SUM OF ALL RESPONSES TO PHQ QUESTIONS 1-9: 0
1. LITTLE INTEREST OR PLEASURE IN DOING THINGS: NOT AT ALL

## 2025-05-29 NOTE — ASSESSMENT & PLAN NOTE
Chronic, at goal (stable), continue current treatment plan, medication adherence emphasized, and lifestyle modifications recommended    Orders:    ALPRAZolam (XANAX) 1 MG tablet; Take 1 tablet by mouth 3 times daily as needed for Anxiety for up to 90 days. Max Daily Amount: 3 mg    I have reviewed the patient’s controlled substance prescription history, as maintained in the [place] prescription monitoring program, so that the prescription(s) for a  controlled substance can be given.  No abnormalities were identified.    Continue alprazolam.    Monitor use.

## 2025-05-29 NOTE — PROGRESS NOTES
Yolanda Murguia (:  1955) is a 69 y.o. female,Established patient, here for evaluation of the following chief complaint(s):  Anxiety (3 month follow up ) and Medication Refill    Administrations This Visit       dexAMETHasone (PF) (DECADRON) injection 15 mg       Admin Date  2025 Action  Given Dose  15 mg Route  [facility] Documented By  Apurva Villeda MA                       Assessment & Plan  Acute right-sided low back pain with right-sided sciatica   New, not at goal (unstable), changes made today: will do steroid injection in office and muscle relaxant in the evenings  and lifestyle modifications recommended    Orders:    cyclobenzaprine (FLEXERIL) 10 MG tablet; Take 1 tablet by mouth 3 times daily as needed for Muscle spasms    dexAMETHasone (PF) (DECADRON) injection 15 mg    Warm compresses to area of discomfort  Diclofenac gel to area of soreness-up to 4 times daily.   Stretching exercises.    EVERT (generalized anxiety disorder)   Chronic, at goal (stable), continue current treatment plan, medication adherence emphasized, and lifestyle modifications recommended    Orders:    ALPRAZolam (XANAX) 1 MG tablet; Take 1 tablet by mouth 3 times daily as needed for Anxiety for up to 90 days. Max Daily Amount: 3 mg    I have reviewed the patient’s controlled substance prescription history, as maintained in the [place] prescription monitoring program, so that the prescription(s) for a  controlled substance can be given.  No abnormalities were identified.    Continue alprazolam.    Monitor use.    Continuous tobacco abuse   Chronic, not at goal (unstable), changes made today: resume Wellbutrin to see if this will help with smoking cessation.      Orders:    buPROPion (WELLBUTRIN XL) 300 MG extended release tablet; Take 1 tablet by mouth every morning    Patient aware of the dangers of smoking.  Is wanting to quit but feels she cannot.    Chronic insomnia   Chronic, at goal

## 2025-05-30 NOTE — PATIENT INSTRUCTIONS

## 2025-08-07 ENCOUNTER — COMMUNITY OUTREACH (OUTPATIENT)
Dept: FAMILY MEDICINE CLINIC | Facility: CLINIC | Age: 70
End: 2025-08-07

## 2025-08-25 SDOH — ECONOMIC STABILITY: INCOME INSECURITY: IN THE LAST 12 MONTHS, WAS THERE A TIME WHEN YOU WERE NOT ABLE TO PAY THE MORTGAGE OR RENT ON TIME?: NO

## 2025-08-25 SDOH — ECONOMIC STABILITY: FOOD INSECURITY: WITHIN THE PAST 12 MONTHS, YOU WORRIED THAT YOUR FOOD WOULD RUN OUT BEFORE YOU GOT MONEY TO BUY MORE.: NEVER TRUE

## 2025-08-25 SDOH — ECONOMIC STABILITY: TRANSPORTATION INSECURITY
IN THE PAST 12 MONTHS, HAS LACK OF TRANSPORTATION KEPT YOU FROM MEETINGS, WORK, OR FROM GETTING THINGS NEEDED FOR DAILY LIVING?: NO

## 2025-08-25 SDOH — ECONOMIC STABILITY: FOOD INSECURITY: WITHIN THE PAST 12 MONTHS, THE FOOD YOU BOUGHT JUST DIDN'T LAST AND YOU DIDN'T HAVE MONEY TO GET MORE.: NEVER TRUE

## 2025-08-25 SDOH — ECONOMIC STABILITY: TRANSPORTATION INSECURITY
IN THE PAST 12 MONTHS, HAS THE LACK OF TRANSPORTATION KEPT YOU FROM MEDICAL APPOINTMENTS OR FROM GETTING MEDICATIONS?: NO

## 2025-08-28 ENCOUNTER — OFFICE VISIT (OUTPATIENT)
Dept: FAMILY MEDICINE CLINIC | Facility: CLINIC | Age: 70
End: 2025-08-28

## 2025-08-28 VITALS
SYSTOLIC BLOOD PRESSURE: 108 MMHG | RESPIRATION RATE: 16 BRPM | BODY MASS INDEX: 23.49 KG/M2 | DIASTOLIC BLOOD PRESSURE: 70 MMHG | HEIGHT: 64 IN | OXYGEN SATURATION: 97 % | HEART RATE: 81 BPM | WEIGHT: 137.6 LBS | TEMPERATURE: 97.6 F

## 2025-08-28 DIAGNOSIS — F41.1 GAD (GENERALIZED ANXIETY DISORDER): Primary | Chronic | ICD-10-CM

## 2025-08-28 DIAGNOSIS — Z51.81 MEDICATION MONITORING ENCOUNTER: ICD-10-CM

## 2025-08-28 DIAGNOSIS — F51.04 CHRONIC INSOMNIA: Chronic | ICD-10-CM

## 2025-08-28 DIAGNOSIS — F17.200 NICOTINE DEPENDENCE WITH CURRENT USE: Chronic | ICD-10-CM

## 2025-08-28 LAB
AMPHETAMINE, URINE, POC: NEGATIVE
BARBITURATES, URINE, POC: NEGATIVE
BENZODIAZEPINES, URINE, POC: NORMAL
BUPRENORPHINE, URINE, POC: NEGATIVE
COCAINE, URINE, POC: NEGATIVE
CREATININE, URINE, POC: NORMAL MG/DL
LOT EXP DATE, POC: NORMAL
Lab: NORMAL
MARIJUANA (THC), URINE, POC: NEGATIVE
METHADONE, URINE, POC: NEGATIVE
METHAMPHETAMINE, URINE, POC: NEGATIVE
OPIATES, URINE, POC: NEGATIVE
OXYCODONE, URINE, POC: NEGATIVE
PH, POC: 6
PHENCYCLIDINE, URINE, POC: NEGATIVE
SPECIFIC GRAVITY, URINE, POC: 1.02 (ref 1–1.03)
TRICYCLICS, URINE, POC: NEGATIVE
VALID INTERNAL CONTROL, POC: YES

## 2025-08-28 PROCEDURE — 81003 URINALYSIS AUTO W/O SCOPE: CPT | Performed by: FAMILY MEDICINE

## 2025-08-28 PROCEDURE — 99214 OFFICE O/P EST MOD 30 MIN: CPT | Performed by: FAMILY MEDICINE

## 2025-08-28 PROCEDURE — 1123F ACP DISCUSS/DSCN MKR DOCD: CPT | Performed by: FAMILY MEDICINE

## 2025-08-28 PROCEDURE — 80305 DRUG TEST PRSMV DIR OPT OBS: CPT | Performed by: FAMILY MEDICINE

## 2025-08-28 RX ORDER — ALPRAZOLAM 1 MG/1
1 TABLET ORAL 2 TIMES DAILY
Qty: 60 TABLET | Refills: 0 | Status: SHIPPED | OUTPATIENT
Start: 2025-08-28 | End: 2025-09-27

## 2025-08-28 RX ORDER — ESCITALOPRAM OXALATE 5 MG/1
5 TABLET ORAL
Qty: 30 TABLET | Refills: 0 | Status: SHIPPED | OUTPATIENT
Start: 2025-08-28

## 2025-08-28 RX ORDER — BUPROPION HYDROCHLORIDE 300 MG/1
300 TABLET ORAL EVERY MORNING
Qty: 90 TABLET | Refills: 1 | Status: SHIPPED | OUTPATIENT
Start: 2025-08-28

## 2025-08-28 ASSESSMENT — PATIENT HEALTH QUESTIONNAIRE - PHQ9
SUM OF ALL RESPONSES TO PHQ QUESTIONS 1-9: 0
1. LITTLE INTEREST OR PLEASURE IN DOING THINGS: NOT AT ALL
SUM OF ALL RESPONSES TO PHQ QUESTIONS 1-9: 0
SUM OF ALL RESPONSES TO PHQ QUESTIONS 1-9: 0
2. FEELING DOWN, DEPRESSED OR HOPELESS: NOT AT ALL
SUM OF ALL RESPONSES TO PHQ QUESTIONS 1-9: 0